# Patient Record
Sex: FEMALE | Race: BLACK OR AFRICAN AMERICAN | NOT HISPANIC OR LATINO
[De-identification: names, ages, dates, MRNs, and addresses within clinical notes are randomized per-mention and may not be internally consistent; named-entity substitution may affect disease eponyms.]

---

## 2017-01-11 ENCOUNTER — APPOINTMENT (OUTPATIENT)
Dept: INTERNAL MEDICINE | Facility: CLINIC | Age: 72
End: 2017-01-11

## 2017-01-11 VITALS
HEART RATE: 119 BPM | RESPIRATION RATE: 14 BRPM | TEMPERATURE: 98.4 F | WEIGHT: 163 LBS | DIASTOLIC BLOOD PRESSURE: 95 MMHG | BODY MASS INDEX: 30.78 KG/M2 | HEIGHT: 61 IN | OXYGEN SATURATION: 98 % | SYSTOLIC BLOOD PRESSURE: 158 MMHG

## 2017-01-11 VITALS — HEART RATE: 89 BPM

## 2017-01-11 DIAGNOSIS — R10.2 PELVIC AND PERINEAL PAIN: ICD-10-CM

## 2017-01-11 DIAGNOSIS — R00.0 TACHYCARDIA, UNSPECIFIED: ICD-10-CM

## 2017-02-05 ENCOUNTER — FORM ENCOUNTER (OUTPATIENT)
Age: 72
End: 2017-02-05

## 2017-02-06 ENCOUNTER — OUTPATIENT (OUTPATIENT)
Dept: OUTPATIENT SERVICES | Facility: HOSPITAL | Age: 72
LOS: 1 days | End: 2017-02-06
Payer: MEDICARE

## 2017-02-06 PROCEDURE — 77080 DXA BONE DENSITY AXIAL: CPT | Mod: 26

## 2017-02-06 PROCEDURE — 76830 TRANSVAGINAL US NON-OB: CPT

## 2017-02-06 PROCEDURE — 76830 TRANSVAGINAL US NON-OB: CPT | Mod: 26

## 2017-02-06 PROCEDURE — 77080 DXA BONE DENSITY AXIAL: CPT

## 2017-02-06 PROCEDURE — 76856 US EXAM PELVIC COMPLETE: CPT

## 2017-02-06 PROCEDURE — 76856 US EXAM PELVIC COMPLETE: CPT | Mod: 26

## 2017-02-09 DIAGNOSIS — D25.2 SUBSEROSAL LEIOMYOMA OF UTERUS: ICD-10-CM

## 2017-02-09 DIAGNOSIS — Z78.0 ASYMPTOMATIC MENOPAUSAL STATE: ICD-10-CM

## 2017-02-09 DIAGNOSIS — D25.1 INTRAMURAL LEIOMYOMA OF UTERUS: ICD-10-CM

## 2017-02-22 ENCOUNTER — APPOINTMENT (OUTPATIENT)
Dept: NEPHROLOGY | Facility: CLINIC | Age: 72
End: 2017-02-22

## 2017-02-22 VITALS — HEART RATE: 86 BPM | SYSTOLIC BLOOD PRESSURE: 120 MMHG | DIASTOLIC BLOOD PRESSURE: 72 MMHG

## 2017-02-22 DIAGNOSIS — E55.9 VITAMIN D DEFICIENCY, UNSPECIFIED: ICD-10-CM

## 2017-02-22 DIAGNOSIS — Z78.9 OTHER SPECIFIED HEALTH STATUS: ICD-10-CM

## 2017-02-23 LAB
APPEARANCE: CLEAR
BILIRUBIN URINE: NEGATIVE
BLOOD URINE: NEGATIVE
COLOR: YELLOW
GLUCOSE QUALITATIVE U: NORMAL MG/DL
KETONES URINE: NEGATIVE
LEUKOCYTE ESTERASE URINE: ABNORMAL
NITRITE URINE: NEGATIVE
PH URINE: 5
PROTEIN URINE: NEGATIVE MG/DL
SPECIFIC GRAVITY URINE: 1.02
UROBILINOGEN URINE: NORMAL MG/DL

## 2017-02-24 LAB — CORE LAB FLUID CYTOLOGY: NORMAL

## 2017-03-08 ENCOUNTER — APPOINTMENT (OUTPATIENT)
Dept: ENDOCRINOLOGY | Facility: CLINIC | Age: 72
End: 2017-03-08

## 2017-03-08 VITALS
HEART RATE: 98 BPM | BODY MASS INDEX: 30.04 KG/M2 | WEIGHT: 159 LBS | DIASTOLIC BLOOD PRESSURE: 78 MMHG | SYSTOLIC BLOOD PRESSURE: 132 MMHG

## 2017-03-09 ENCOUNTER — LABORATORY RESULT (OUTPATIENT)
Age: 72
End: 2017-03-09

## 2017-03-09 ENCOUNTER — APPOINTMENT (OUTPATIENT)
Dept: OBGYN | Facility: CLINIC | Age: 72
End: 2017-03-09

## 2017-03-09 VITALS
WEIGHT: 159 LBS | HEIGHT: 61 IN | SYSTOLIC BLOOD PRESSURE: 130 MMHG | BODY MASS INDEX: 30.02 KG/M2 | DIASTOLIC BLOOD PRESSURE: 90 MMHG

## 2017-03-09 DIAGNOSIS — Z86.19 PERSONAL HISTORY OF OTHER INFECTIOUS AND PARASITIC DISEASES: ICD-10-CM

## 2017-03-09 DIAGNOSIS — Z11.3 ENCOUNTER FOR SCREENING FOR INFECTIONS WITH A PREDOMINANTLY SEXUAL MODE OF TRANSMISSION: ICD-10-CM

## 2017-03-09 DIAGNOSIS — Z01.419 ENCOUNTER FOR GYNECOLOGICAL EXAMINATION (GENERAL) (ROUTINE) W/OUT ABNORMAL FINDINGS: ICD-10-CM

## 2017-03-13 LAB
C TRACH RRNA SPEC QL NAA+PROBE: NORMAL
CANDIDA VAG CYTO: DETECTED
G VAGINALIS+PREV SP MTYP VAG QL MICRO: NOT DETECTED
HBV SURFACE AG SER QL: NONREACTIVE
HIV1+2 AB SPEC QL IA.RAPID: NONREACTIVE
N GONORRHOEA RRNA SPEC QL NAA+PROBE: NORMAL
SOURCE TP AMPLIFICATION: NORMAL
T PALLIDUM AB SER QL IA: POSITIVE
T VAGINALIS VAG QL WET PREP: NOT DETECTED

## 2017-05-15 ENCOUNTER — APPOINTMENT (OUTPATIENT)
Dept: INTERNAL MEDICINE | Facility: CLINIC | Age: 72
End: 2017-05-15

## 2017-05-15 VITALS
BODY MASS INDEX: 30.4 KG/M2 | SYSTOLIC BLOOD PRESSURE: 137 MMHG | WEIGHT: 161 LBS | TEMPERATURE: 99 F | HEART RATE: 98 BPM | RESPIRATION RATE: 14 BRPM | HEIGHT: 61 IN | OXYGEN SATURATION: 98 % | DIASTOLIC BLOOD PRESSURE: 82 MMHG

## 2017-05-17 ENCOUNTER — OTHER (OUTPATIENT)
Age: 72
End: 2017-05-17

## 2017-06-05 ENCOUNTER — APPOINTMENT (OUTPATIENT)
Dept: GASTROENTEROLOGY | Facility: CLINIC | Age: 72
End: 2017-06-05

## 2017-06-05 VITALS
SYSTOLIC BLOOD PRESSURE: 130 MMHG | HEIGHT: 61 IN | DIASTOLIC BLOOD PRESSURE: 74 MMHG | BODY MASS INDEX: 30.02 KG/M2 | WEIGHT: 159 LBS | HEART RATE: 91 BPM | OXYGEN SATURATION: 98 % | TEMPERATURE: 98.4 F | RESPIRATION RATE: 16 BRPM

## 2017-06-06 ENCOUNTER — APPOINTMENT (OUTPATIENT)
Dept: ENDOCRINOLOGY | Facility: CLINIC | Age: 72
End: 2017-06-06

## 2017-06-06 VITALS
BODY MASS INDEX: 29.3 KG/M2 | WEIGHT: 161.25 LBS | DIASTOLIC BLOOD PRESSURE: 69 MMHG | HEIGHT: 62.4 IN | SYSTOLIC BLOOD PRESSURE: 116 MMHG | HEART RATE: 87 BPM

## 2017-06-06 RX ORDER — MICONAZOLE NITRATE 4 %
4 CREAM WITH PREFILLED APPLICATOR VAGINAL
Qty: 30 | Refills: 0 | Status: DISCONTINUED | COMMUNITY
Start: 2017-03-09 | End: 2017-06-06

## 2017-06-07 LAB
ALBUMIN SERPL ELPH-MCNC: 3.8 G/DL
ALP BLD-CCNC: 72 U/L
ALT SERPL-CCNC: 13 U/L
ANION GAP SERPL CALC-SCNC: 21 MMOL/L
AST SERPL-CCNC: 12 U/L
BASOPHILS # BLD AUTO: 0.05 K/UL
BASOPHILS NFR BLD AUTO: 0.5 %
BILIRUB SERPL-MCNC: 0.2 MG/DL
BUN SERPL-MCNC: 29 MG/DL
CALCIUM SERPL-MCNC: 9.9 MG/DL
CHLORIDE SERPL-SCNC: 100 MMOL/L
CO2 SERPL-SCNC: 19 MMOL/L
CREAT SERPL-MCNC: 1.27 MG/DL
EOSINOPHIL # BLD AUTO: 0.29 K/UL
EOSINOPHIL NFR BLD AUTO: 3 %
GLUCOSE SERPL-MCNC: 151 MG/DL
HCT VFR BLD CALC: 37.8 %
HGB BLD-MCNC: 12.2 G/DL
IMM GRANULOCYTES NFR BLD AUTO: 0.2 %
LYMPHOCYTES # BLD AUTO: 4.19 K/UL
LYMPHOCYTES NFR BLD AUTO: 43 %
MAN DIFF?: NORMAL
MCHC RBC-ENTMCNC: 27.7 PG
MCHC RBC-ENTMCNC: 32.3 GM/DL
MCV RBC AUTO: 85.7 FL
MONOCYTES # BLD AUTO: 0.52 K/UL
MONOCYTES NFR BLD AUTO: 5.3 %
NEUTROPHILS # BLD AUTO: 4.67 K/UL
NEUTROPHILS NFR BLD AUTO: 48 %
PLATELET # BLD AUTO: 339 K/UL
POTASSIUM SERPL-SCNC: 4.6 MMOL/L
PROT SERPL-MCNC: 6.9 G/DL
RBC # BLD: 4.41 M/UL
RBC # FLD: 13.7 %
SODIUM SERPL-SCNC: 140 MMOL/L
WBC # FLD AUTO: 9.74 K/UL

## 2017-06-09 LAB
CREAT SPEC-SCNC: 116 MG/DL
HBA1C MFR BLD HPLC: 8.5 %
MICROALBUMIN 24H UR DL<=1MG/L-MCNC: 2.1 MG/DL
MICROALBUMIN/CREAT 24H UR-RTO: 18

## 2017-06-22 ENCOUNTER — APPOINTMENT (OUTPATIENT)
Dept: NEPHROLOGY | Facility: CLINIC | Age: 72
End: 2017-06-22

## 2017-06-22 VITALS
SYSTOLIC BLOOD PRESSURE: 112 MMHG | WEIGHT: 161 LBS | DIASTOLIC BLOOD PRESSURE: 68 MMHG | BODY MASS INDEX: 29.07 KG/M2 | HEART RATE: 80 BPM

## 2017-06-28 ENCOUNTER — APPOINTMENT (OUTPATIENT)
Dept: HEART AND VASCULAR | Facility: CLINIC | Age: 72
End: 2017-06-28

## 2017-06-28 VITALS
BODY MASS INDEX: 29.96 KG/M2 | HEART RATE: 90 BPM | OXYGEN SATURATION: 99 % | WEIGHT: 160.72 LBS | SYSTOLIC BLOOD PRESSURE: 116 MMHG | HEIGHT: 61.42 IN | TEMPERATURE: 98 F | DIASTOLIC BLOOD PRESSURE: 64 MMHG

## 2017-06-28 DIAGNOSIS — Z86.79 PERSONAL HISTORY OF OTHER DISEASES OF THE CIRCULATORY SYSTEM: ICD-10-CM

## 2017-06-28 DIAGNOSIS — Z01.818 ENCOUNTER FOR OTHER PREPROCEDURAL EXAMINATION: ICD-10-CM

## 2017-08-01 ENCOUNTER — APPOINTMENT (OUTPATIENT)
Dept: GASTROENTEROLOGY | Facility: HOSPITAL | Age: 72
End: 2017-08-01

## 2017-08-25 ENCOUNTER — OTHER (OUTPATIENT)
Age: 72
End: 2017-08-25

## 2017-08-25 ENCOUNTER — RX RENEWAL (OUTPATIENT)
Age: 72
End: 2017-08-25

## 2017-08-30 ENCOUNTER — RX RENEWAL (OUTPATIENT)
Age: 72
End: 2017-08-30

## 2017-08-31 ENCOUNTER — RESULT REVIEW (OUTPATIENT)
Age: 72
End: 2017-08-31

## 2017-08-31 ENCOUNTER — OUTPATIENT (OUTPATIENT)
Dept: OUTPATIENT SERVICES | Facility: HOSPITAL | Age: 72
LOS: 1 days | Discharge: HOME | End: 2017-08-31
Payer: MEDICARE

## 2017-08-31 ENCOUNTER — APPOINTMENT (OUTPATIENT)
Dept: GASTROENTEROLOGY | Facility: HOSPITAL | Age: 72
End: 2017-08-31

## 2017-08-31 PROCEDURE — 88305 TISSUE EXAM BY PATHOLOGIST: CPT

## 2017-08-31 PROCEDURE — 45380 COLONOSCOPY AND BIOPSY: CPT | Mod: GC

## 2017-08-31 PROCEDURE — 45380 COLONOSCOPY AND BIOPSY: CPT

## 2017-09-01 LAB — SURGICAL PATHOLOGY STUDY: SIGNIFICANT CHANGE UP

## 2017-09-04 DIAGNOSIS — E11.9 TYPE 2 DIABETES MELLITUS WITHOUT COMPLICATIONS: ICD-10-CM

## 2017-09-04 DIAGNOSIS — D12.2 BENIGN NEOPLASM OF ASCENDING COLON: ICD-10-CM

## 2017-09-04 DIAGNOSIS — Z12.11 ENCOUNTER FOR SCREENING FOR MALIGNANT NEOPLASM OF COLON: ICD-10-CM

## 2017-09-04 DIAGNOSIS — I10 ESSENTIAL (PRIMARY) HYPERTENSION: ICD-10-CM

## 2017-10-04 ENCOUNTER — APPOINTMENT (OUTPATIENT)
Dept: ENDOCRINOLOGY | Facility: CLINIC | Age: 72
End: 2017-10-04
Payer: MEDICARE

## 2017-10-04 VITALS
BODY MASS INDEX: 30.01 KG/M2 | DIASTOLIC BLOOD PRESSURE: 77 MMHG | SYSTOLIC BLOOD PRESSURE: 153 MMHG | HEIGHT: 61.42 IN | HEART RATE: 85 BPM | WEIGHT: 161 LBS

## 2017-10-04 PROCEDURE — 99214 OFFICE O/P EST MOD 30 MIN: CPT

## 2017-10-05 LAB
ANION GAP SERPL CALC-SCNC: 16 MMOL/L
BUN SERPL-MCNC: 38 MG/DL
CALCIUM SERPL-MCNC: 10.1 MG/DL
CHLORIDE SERPL-SCNC: 102 MMOL/L
CO2 SERPL-SCNC: 26 MMOL/L
CREAT SERPL-MCNC: 1.26 MG/DL
GLUCOSE SERPL-MCNC: 72 MG/DL
HBA1C MFR BLD HPLC: 7.8 %
POTASSIUM SERPL-SCNC: 5.1 MMOL/L
SODIUM SERPL-SCNC: 144 MMOL/L
TSH SERPL-ACNC: 1.67 UIU/ML

## 2017-10-10 ENCOUNTER — LABORATORY RESULT (OUTPATIENT)
Age: 72
End: 2017-10-10

## 2017-10-10 ENCOUNTER — APPOINTMENT (OUTPATIENT)
Dept: OBGYN | Facility: CLINIC | Age: 72
End: 2017-10-10
Payer: MEDICARE

## 2017-10-10 VITALS
HEIGHT: 61.42 IN | WEIGHT: 166 LBS | DIASTOLIC BLOOD PRESSURE: 80 MMHG | SYSTOLIC BLOOD PRESSURE: 120 MMHG | BODY MASS INDEX: 30.94 KG/M2

## 2017-10-10 DIAGNOSIS — R30.0 DYSURIA: ICD-10-CM

## 2017-10-10 PROCEDURE — 99214 OFFICE O/P EST MOD 30 MIN: CPT

## 2017-10-11 ENCOUNTER — APPOINTMENT (OUTPATIENT)
Dept: INTERNAL MEDICINE | Facility: CLINIC | Age: 72
End: 2017-10-11
Payer: MEDICARE

## 2017-10-11 VITALS
OXYGEN SATURATION: 99 % | HEART RATE: 93 BPM | SYSTOLIC BLOOD PRESSURE: 125 MMHG | DIASTOLIC BLOOD PRESSURE: 71 MMHG | TEMPERATURE: 98.9 F | HEIGHT: 61 IN | RESPIRATION RATE: 16 BRPM | BODY MASS INDEX: 30.96 KG/M2 | WEIGHT: 164 LBS

## 2017-10-11 LAB
APPEARANCE: ABNORMAL
BILIRUBIN URINE: NEGATIVE
BLOOD URINE: ABNORMAL
COLOR: YELLOW
GLUCOSE QUALITATIVE U: NEGATIVE MG/DL
KETONES URINE: NEGATIVE
LEUKOCYTE ESTERASE URINE: ABNORMAL
NITRITE URINE: NEGATIVE
PH URINE: 6
PROTEIN URINE: 30 MG/DL
SPECIFIC GRAVITY URINE: 1.01
UROBILINOGEN URINE: NEGATIVE MG/DL

## 2017-10-11 PROCEDURE — 99214 OFFICE O/P EST MOD 30 MIN: CPT

## 2017-10-12 LAB
CANDIDA VAG CYTO: DETECTED
G VAGINALIS+PREV SP MTYP VAG QL MICRO: NOT DETECTED
T VAGINALIS VAG QL WET PREP: NOT DETECTED

## 2017-10-23 ENCOUNTER — OTHER (OUTPATIENT)
Age: 72
End: 2017-10-23

## 2017-10-24 ENCOUNTER — LABORATORY RESULT (OUTPATIENT)
Age: 72
End: 2017-10-24

## 2017-10-24 ENCOUNTER — APPOINTMENT (OUTPATIENT)
Dept: OBGYN | Facility: CLINIC | Age: 72
End: 2017-10-24
Payer: MEDICARE

## 2017-10-24 VITALS
BODY MASS INDEX: 32.79 KG/M2 | HEIGHT: 60 IN | SYSTOLIC BLOOD PRESSURE: 140 MMHG | DIASTOLIC BLOOD PRESSURE: 84 MMHG | WEIGHT: 167 LBS

## 2017-10-24 DIAGNOSIS — L28.0 LICHEN SIMPLEX CHRONICUS: ICD-10-CM

## 2017-10-24 PROCEDURE — 56605 BIOPSY OF VULVA/PERINEUM: CPT

## 2017-10-24 RX ORDER — HALOBETASOL PROPIONATE 0.5 MG/G
0.05 OINTMENT TOPICAL
Qty: 1 | Refills: 1 | Status: ACTIVE | COMMUNITY
Start: 2017-10-24 | End: 1900-01-01

## 2017-10-24 RX ORDER — CLOBETASOL PROPIONATE 0.5 MG/G
0.05 OINTMENT TOPICAL TWICE DAILY
Qty: 1 | Refills: 3 | Status: COMPLETED | COMMUNITY
Start: 2017-03-09 | End: 2017-10-24

## 2017-10-25 LAB
APPEARANCE: CLEAR
BILIRUBIN URINE: NEGATIVE
BLOOD URINE: NEGATIVE
COLOR: YELLOW
GLUCOSE QUALITATIVE U: NEGATIVE MG/DL
KETONES URINE: NEGATIVE
LEUKOCYTE ESTERASE URINE: ABNORMAL
NITRITE URINE: NEGATIVE
PH URINE: 5.5
PROTEIN URINE: NEGATIVE MG/DL
SPECIFIC GRAVITY URINE: 1.02
UROBILINOGEN URINE: NEGATIVE MG/DL

## 2017-10-26 ENCOUNTER — OTHER (OUTPATIENT)
Age: 72
End: 2017-10-26

## 2017-10-26 LAB
BACTERIA UR CULT: NORMAL
CORE LAB FLUID CYTOLOGY: NORMAL

## 2017-10-31 LAB — CORE LAB BIOPSY: NORMAL

## 2017-11-07 ENCOUNTER — APPOINTMENT (OUTPATIENT)
Dept: OBGYN | Facility: CLINIC | Age: 72
End: 2017-11-07
Payer: MEDICARE

## 2017-11-07 VITALS
SYSTOLIC BLOOD PRESSURE: 130 MMHG | WEIGHT: 167 LBS | HEIGHT: 60 IN | BODY MASS INDEX: 32.79 KG/M2 | DIASTOLIC BLOOD PRESSURE: 80 MMHG

## 2017-11-07 PROCEDURE — 99213 OFFICE O/P EST LOW 20 MIN: CPT

## 2017-11-09 ENCOUNTER — OTHER (OUTPATIENT)
Age: 72
End: 2017-11-09

## 2017-11-09 LAB
APPEARANCE: CLEAR
BILIRUBIN URINE: NEGATIVE
BLOOD URINE: NEGATIVE
COLOR: YELLOW
GLUCOSE QUALITATIVE U: NEGATIVE MG/DL
KETONES URINE: NEGATIVE
LEUKOCYTE ESTERASE URINE: NEGATIVE
NITRITE URINE: NEGATIVE
PH URINE: 5
PROTEIN URINE: NEGATIVE MG/DL
SPECIFIC GRAVITY URINE: 1.02
UROBILINOGEN URINE: NEGATIVE MG/DL

## 2017-11-13 ENCOUNTER — APPOINTMENT (OUTPATIENT)
Dept: OBGYN | Facility: CLINIC | Age: 72
End: 2017-11-13
Payer: MEDICARE

## 2017-11-13 VITALS — SYSTOLIC BLOOD PRESSURE: 130 MMHG | DIASTOLIC BLOOD PRESSURE: 80 MMHG

## 2017-11-13 VITALS
SYSTOLIC BLOOD PRESSURE: 170 MMHG | HEIGHT: 60 IN | DIASTOLIC BLOOD PRESSURE: 100 MMHG | WEIGHT: 167 LBS | BODY MASS INDEX: 32.79 KG/M2

## 2017-11-13 VITALS — DIASTOLIC BLOOD PRESSURE: 100 MMHG | SYSTOLIC BLOOD PRESSURE: 150 MMHG

## 2017-11-13 DIAGNOSIS — N88.2 STRICTURE AND STENOSIS OF CERVIX UTERI: ICD-10-CM

## 2017-11-13 DIAGNOSIS — Z86.018 PERSONAL HISTORY OF OTHER BENIGN NEOPLASM: ICD-10-CM

## 2017-11-13 LAB
HCG UR QL: NEGATIVE
QUALITY CONTROL: YES

## 2017-11-13 PROCEDURE — 58100 BIOPSY OF UTERUS LINING: CPT

## 2017-11-13 PROCEDURE — 81025 URINE PREGNANCY TEST: CPT

## 2017-11-14 ENCOUNTER — APPOINTMENT (OUTPATIENT)
Dept: OPHTHALMOLOGY | Facility: CLINIC | Age: 72
End: 2017-11-14
Payer: MEDICARE

## 2017-11-14 DIAGNOSIS — H25.13 AGE-RELATED NUCLEAR CATARACT, BILATERAL: ICD-10-CM

## 2017-11-14 PROCEDURE — 92004 COMPRE OPH EXAM NEW PT 1/>: CPT

## 2017-11-14 PROCEDURE — 92225: CPT | Mod: LT

## 2017-11-14 PROCEDURE — 92134 CPTRZ OPH DX IMG PST SGM RTA: CPT

## 2017-11-20 LAB — CORE LAB BIOPSY: NORMAL

## 2017-11-21 ENCOUNTER — APPOINTMENT (OUTPATIENT)
Dept: OBGYN | Facility: CLINIC | Age: 72
End: 2017-11-21
Payer: MEDICARE

## 2017-11-21 VITALS
BODY MASS INDEX: 32.79 KG/M2 | WEIGHT: 167 LBS | SYSTOLIC BLOOD PRESSURE: 150 MMHG | DIASTOLIC BLOOD PRESSURE: 80 MMHG | HEIGHT: 60 IN

## 2017-11-21 PROCEDURE — 99213 OFFICE O/P EST LOW 20 MIN: CPT

## 2017-11-27 ENCOUNTER — APPOINTMENT (OUTPATIENT)
Dept: UROLOGY | Facility: CLINIC | Age: 72
End: 2017-11-27
Payer: MEDICARE

## 2017-11-27 VITALS — TEMPERATURE: 99.4 F | DIASTOLIC BLOOD PRESSURE: 79 MMHG | SYSTOLIC BLOOD PRESSURE: 139 MMHG | HEART RATE: 98 BPM

## 2017-11-27 DIAGNOSIS — N39.0 URINARY TRACT INFECTION, SITE NOT SPECIFIED: ICD-10-CM

## 2017-11-27 DIAGNOSIS — R31.0 GROSS HEMATURIA: ICD-10-CM

## 2017-11-27 DIAGNOSIS — R10.9 UNSPECIFIED ABDOMINAL PAIN: ICD-10-CM

## 2017-11-27 PROCEDURE — 99204 OFFICE O/P NEW MOD 45 MIN: CPT

## 2017-11-28 LAB
APPEARANCE: CLEAR
BACTERIA: NEGATIVE
BILIRUBIN URINE: NEGATIVE
BLOOD URINE: NEGATIVE
COLOR: YELLOW
GLUCOSE QUALITATIVE U: 100 MG/DL
HYALINE CASTS: 3 /LPF
KETONES URINE: NEGATIVE
LEUKOCYTE ESTERASE URINE: NEGATIVE
MICROSCOPIC-UA: NORMAL
NITRITE URINE: NEGATIVE
PH URINE: 7.5
PROTEIN URINE: NEGATIVE MG/DL
RED BLOOD CELLS URINE: 1 /HPF
SPECIFIC GRAVITY URINE: 1.02
SQUAMOUS EPITHELIAL CELLS: 6 /HPF
UROBILINOGEN URINE: NEGATIVE MG/DL
WHITE BLOOD CELLS URINE: 2 /HPF

## 2017-11-29 LAB — BACTERIA UR CULT: ABNORMAL

## 2017-12-02 LAB — HLX UV FISH FINAL REPORT: NORMAL

## 2017-12-11 ENCOUNTER — FORM ENCOUNTER (OUTPATIENT)
Age: 72
End: 2017-12-11

## 2017-12-12 ENCOUNTER — OUTPATIENT (OUTPATIENT)
Dept: OUTPATIENT SERVICES | Facility: HOSPITAL | Age: 72
LOS: 1 days | End: 2017-12-12
Payer: MEDICARE

## 2017-12-12 PROCEDURE — 76770 US EXAM ABDO BACK WALL COMP: CPT | Mod: 26

## 2017-12-12 PROCEDURE — 76770 US EXAM ABDO BACK WALL COMP: CPT

## 2017-12-19 LAB
24R-OH-CALCIDIOL SERPL-MCNC: 54.9 PG/ML
25(OH)D3 SERPL-MCNC: 44.9 NG/ML
ALBUMIN SERPL ELPH-MCNC: 4.5 G/DL
ALP BLD-CCNC: 74 U/L
ALT SERPL-CCNC: 15 U/L
ANION GAP SERPL CALC-SCNC: 14 MMOL/L
APPEARANCE: CLEAR
AST SERPL-CCNC: 14 U/L
BACTERIA: ABNORMAL
BASOPHILS # BLD AUTO: 0.08 K/UL
BASOPHILS NFR BLD AUTO: 0.9 %
BILIRUB SERPL-MCNC: 0.2 MG/DL
BILIRUBIN URINE: NEGATIVE
BLOOD URINE: NEGATIVE
BUN SERPL-MCNC: 36 MG/DL
CALCIUM SERPL-MCNC: 10.7 MG/DL
CALCIUM SERPL-MCNC: 10.7 MG/DL
CHLORIDE SERPL-SCNC: 103 MMOL/L
CHOLEST SERPL-MCNC: 181 MG/DL
CHOLEST/HDLC SERPL: 3.3 RATIO
CO2 SERPL-SCNC: 25 MMOL/L
COLOR: YELLOW
CREAT SERPL-MCNC: 1.29 MG/DL
CREAT SPEC-SCNC: 58 MG/DL
EOSINOPHIL # BLD AUTO: 0.33 K/UL
EOSINOPHIL NFR BLD AUTO: 3.8 %
FERRITIN SERPL-MCNC: 38 NG/ML
GLUCOSE QUALITATIVE U: NEGATIVE MG/DL
GLUCOSE SERPL-MCNC: 152 MG/DL
HBA1C MFR BLD HPLC: 7.8 %
HCT VFR BLD CALC: 37.4 %
HDLC SERPL-MCNC: 55 MG/DL
HGB BLD-MCNC: 12.3 G/DL
HYALINE CASTS: 5 /LPF
IMM GRANULOCYTES NFR BLD AUTO: 0.2 %
IRON SATN MFR SERPL: 16 %
IRON SERPL-MCNC: 65 UG/DL
KETONES URINE: NEGATIVE
LDLC SERPL CALC-MCNC: 99 MG/DL
LEUKOCYTE ESTERASE URINE: NEGATIVE
LYMPHOCYTES # BLD AUTO: 4.43 K/UL
LYMPHOCYTES NFR BLD AUTO: 51.2 %
MAGNESIUM SERPL-MCNC: 1.7 MG/DL
MAN DIFF?: NORMAL
MCHC RBC-ENTMCNC: 28.5 PG
MCHC RBC-ENTMCNC: 32.9 GM/DL
MCV RBC AUTO: 86.6 FL
MICROALBUMIN 24H UR DL<=1MG/L-MCNC: 8.4 MG/DL
MICROALBUMIN/CREAT 24H UR-RTO: 145 MG/G
MICROSCOPIC-UA: NORMAL
MONOCYTES # BLD AUTO: 0.49 K/UL
MONOCYTES NFR BLD AUTO: 5.7 %
NEUTROPHILS # BLD AUTO: 3.31 K/UL
NEUTROPHILS NFR BLD AUTO: 38.2 %
NITRITE URINE: NEGATIVE
PARATHYROID HORMONE INTACT: 31 PG/ML
PH URINE: 5.5
PHOSPHATE SERPL-MCNC: 3.4 MG/DL
PLATELET # BLD AUTO: 343 K/UL
POTASSIUM SERPL-SCNC: 5.2 MMOL/L
PROT SERPL-MCNC: 7.4 G/DL
PROTEIN URINE: ABNORMAL MG/DL
RBC # BLD: 4.32 M/UL
RBC # FLD: 13.8 %
RED BLOOD CELLS URINE: 3 /HPF
SODIUM SERPL-SCNC: 142 MMOL/L
SPECIFIC GRAVITY URINE: 1.02
SQUAMOUS EPITHELIAL CELLS: 7 /HPF
TIBC SERPL-MCNC: 417 UG/DL
TRIGL SERPL-MCNC: 135 MG/DL
UIBC SERPL-MCNC: 352 UG/DL
URATE SERPL-MCNC: 6.1 MG/DL
UROBILINOGEN URINE: NEGATIVE MG/DL
WBC # FLD AUTO: 8.66 K/UL
WHITE BLOOD CELLS URINE: 5 /HPF

## 2017-12-20 ENCOUNTER — APPOINTMENT (OUTPATIENT)
Dept: NEPHROLOGY | Facility: CLINIC | Age: 72
End: 2017-12-20
Payer: MEDICARE

## 2017-12-20 VITALS
HEART RATE: 80 BPM | WEIGHT: 167 LBS | SYSTOLIC BLOOD PRESSURE: 130 MMHG | BODY MASS INDEX: 32.62 KG/M2 | DIASTOLIC BLOOD PRESSURE: 80 MMHG

## 2017-12-20 PROCEDURE — 99214 OFFICE O/P EST MOD 30 MIN: CPT

## 2017-12-20 RX ORDER — POLYETHYLENE GLYCOL 3350 AND ELECTROLYTES WITH LEMON FLAVOR 236; 22.74; 6.74; 5.86; 2.97 G/4L; G/4L; G/4L; G/4L; G/4L
236 POWDER, FOR SOLUTION ORAL
Qty: 1 | Refills: 0 | Status: DISCONTINUED | COMMUNITY
Start: 2017-07-25 | End: 2017-12-20

## 2017-12-20 RX ORDER — NAPROXEN 250 MG/1
250 TABLET ORAL
Qty: 10 | Refills: 0 | Status: DISCONTINUED | COMMUNITY
Start: 2017-10-11 | End: 2017-12-20

## 2017-12-20 RX ORDER — CIPROFLOXACIN HYDROCHLORIDE 500 MG/1
500 TABLET, FILM COATED ORAL
Qty: 1 | Refills: 0 | Status: DISCONTINUED | COMMUNITY
Start: 2017-10-10 | End: 2017-12-20

## 2018-01-10 ENCOUNTER — APPOINTMENT (OUTPATIENT)
Dept: UROLOGY | Facility: CLINIC | Age: 73
End: 2018-01-10

## 2018-02-12 ENCOUNTER — APPOINTMENT (OUTPATIENT)
Dept: INTERNAL MEDICINE | Facility: CLINIC | Age: 73
End: 2018-02-12
Payer: MEDICARE

## 2018-02-12 VITALS
HEIGHT: 60 IN | OXYGEN SATURATION: 99 % | RESPIRATION RATE: 16 BRPM | TEMPERATURE: 98.7 F | DIASTOLIC BLOOD PRESSURE: 88 MMHG | WEIGHT: 165.13 LBS | SYSTOLIC BLOOD PRESSURE: 147 MMHG | BODY MASS INDEX: 32.42 KG/M2 | HEART RATE: 103 BPM

## 2018-02-12 DIAGNOSIS — Z23 ENCOUNTER FOR IMMUNIZATION: ICD-10-CM

## 2018-02-12 PROCEDURE — 99214 OFFICE O/P EST MOD 30 MIN: CPT | Mod: 25

## 2018-02-12 PROCEDURE — G0009: CPT

## 2018-02-12 PROCEDURE — 90732 PPSV23 VACC 2 YRS+ SUBQ/IM: CPT

## 2018-02-13 ENCOUNTER — APPOINTMENT (OUTPATIENT)
Dept: OPHTHALMOLOGY | Facility: CLINIC | Age: 73
End: 2018-02-13
Payer: MEDICARE

## 2018-02-13 PROCEDURE — 92133 CPTRZD OPH DX IMG PST SGM ON: CPT

## 2018-02-13 PROCEDURE — 92083 EXTENDED VISUAL FIELD XM: CPT

## 2018-02-13 PROCEDURE — 76514 ECHO EXAM OF EYE THICKNESS: CPT

## 2018-02-13 PROCEDURE — 92012 INTRM OPH EXAM EST PATIENT: CPT

## 2018-03-01 ENCOUNTER — RX RENEWAL (OUTPATIENT)
Age: 73
End: 2018-03-01

## 2018-04-06 ENCOUNTER — MEDICATION RENEWAL (OUTPATIENT)
Age: 73
End: 2018-04-06

## 2018-04-06 ENCOUNTER — APPOINTMENT (OUTPATIENT)
Dept: ENDOCRINOLOGY | Facility: CLINIC | Age: 73
End: 2018-04-06
Payer: MEDICARE

## 2018-04-06 VITALS
HEIGHT: 62 IN | WEIGHT: 163 LBS | DIASTOLIC BLOOD PRESSURE: 76 MMHG | HEART RATE: 82 BPM | BODY MASS INDEX: 30 KG/M2 | SYSTOLIC BLOOD PRESSURE: 126 MMHG

## 2018-04-06 PROCEDURE — 99214 OFFICE O/P EST MOD 30 MIN: CPT

## 2018-04-09 LAB
ANION GAP SERPL CALC-SCNC: 13 MMOL/L
BUN SERPL-MCNC: 37 MG/DL
CALCIUM SERPL-MCNC: 10.2 MG/DL
CHLORIDE SERPL-SCNC: 102 MMOL/L
CHOLEST SERPL-MCNC: 214 MG/DL
CHOLEST/HDLC SERPL: 3.6 RATIO
CO2 SERPL-SCNC: 28 MMOL/L
CREAT SERPL-MCNC: 1.43 MG/DL
GLUCOSE SERPL-MCNC: 64 MG/DL
HBA1C MFR BLD HPLC: 8.8 %
HDLC SERPL-MCNC: 60 MG/DL
LDLC SERPL CALC-MCNC: 131 MG/DL
POTASSIUM SERPL-SCNC: 4.2 MMOL/L
SODIUM SERPL-SCNC: 143 MMOL/L
TRIGL SERPL-MCNC: 113 MG/DL

## 2018-04-25 ENCOUNTER — APPOINTMENT (OUTPATIENT)
Dept: NEPHROLOGY | Facility: CLINIC | Age: 73
End: 2018-04-25
Payer: MEDICARE

## 2018-04-25 VITALS
BODY MASS INDEX: 29.45 KG/M2 | DIASTOLIC BLOOD PRESSURE: 70 MMHG | WEIGHT: 161 LBS | HEART RATE: 80 BPM | SYSTOLIC BLOOD PRESSURE: 120 MMHG

## 2018-04-25 DIAGNOSIS — Z78.9 OTHER SPECIFIED HEALTH STATUS: ICD-10-CM

## 2018-04-25 PROCEDURE — 99214 OFFICE O/P EST MOD 30 MIN: CPT

## 2018-04-26 LAB
CREAT SPEC-SCNC: 89 MG/DL
MICROALBUMIN 24H UR DL<=1MG/L-MCNC: 5.5 MG/DL
MICROALBUMIN/CREAT 24H UR-RTO: 62 MG/G

## 2018-04-29 PROBLEM — Z78.9 DOES NOT USE ILLICIT DRUGS: Status: ACTIVE | Noted: 2017-06-28

## 2018-04-29 PROBLEM — Z78.9 CAFFEINE USE: Status: ACTIVE | Noted: 2017-06-28

## 2018-05-08 ENCOUNTER — APPOINTMENT (OUTPATIENT)
Dept: INTERNAL MEDICINE | Facility: CLINIC | Age: 73
End: 2018-05-08
Payer: MEDICARE

## 2018-05-08 VITALS
HEIGHT: 62 IN | OXYGEN SATURATION: 98 % | BODY MASS INDEX: 30.18 KG/M2 | TEMPERATURE: 98.6 F | DIASTOLIC BLOOD PRESSURE: 84 MMHG | HEART RATE: 106 BPM | WEIGHT: 164 LBS | SYSTOLIC BLOOD PRESSURE: 141 MMHG

## 2018-05-08 PROCEDURE — 99214 OFFICE O/P EST MOD 30 MIN: CPT

## 2018-06-06 ENCOUNTER — LABORATORY RESULT (OUTPATIENT)
Age: 73
End: 2018-06-06

## 2018-06-06 ENCOUNTER — APPOINTMENT (OUTPATIENT)
Dept: INTERNAL MEDICINE | Facility: CLINIC | Age: 73
End: 2018-06-06
Payer: MEDICARE

## 2018-06-06 VITALS
DIASTOLIC BLOOD PRESSURE: 86 MMHG | TEMPERATURE: 98.6 F | HEIGHT: 62 IN | SYSTOLIC BLOOD PRESSURE: 143 MMHG | HEART RATE: 107 BPM | OXYGEN SATURATION: 98 % | WEIGHT: 161 LBS | BODY MASS INDEX: 29.63 KG/M2

## 2018-06-06 PROCEDURE — 99214 OFFICE O/P EST MOD 30 MIN: CPT | Mod: 25

## 2018-06-06 PROCEDURE — 36415 COLL VENOUS BLD VENIPUNCTURE: CPT

## 2018-06-12 LAB
A ALTERNATA IGE QN: <0.1 KUA/L
A FUMIGATUS IGE QN: <0.1 KUA/L
BARLEY IGE QN: <0.1 KUA/L
BERMUDA GRASS IGE QN: <0.1 KUA/L
BOXELDER IGE QN: <0.1 KUA/L
C HERBARUM IGE QN: <0.1 KUA/L
CALIF WALNUT IGE QN: <0.1 KUA/L
CAT DANDER IGE QN: <0.1 KUA/L
CHERRY IGE QN: <0.1 KUA/L
CMN PIGWEED IGE QN: <0.1 KUA/L
COMMON RAGWEED IGE QN: <0.1 KUA/L
COTTONWOOD IGE QN: <0.1 KUA/L
COW MILK IGE QN: <0.1 KUA/L
CRAB IGE QN: 0.7 KUA/L
D FARINAE IGE QN: 1.1 KUA/L
D PTERONYSS IGE QN: 1.13 KUA/L
DEPRECATED A ALTERNATA IGE RAST QL: 0
DEPRECATED A FUMIGATUS IGE RAST QL: 0
DEPRECATED BARLEY IGE RAST QL: 0
DEPRECATED BERMUDA GRASS IGE RAST QL: 0
DEPRECATED BOXELDER IGE RAST QL: 0
DEPRECATED C HERBARUM IGE RAST QL: 0
DEPRECATED CAT DANDER IGE RAST QL: 0
DEPRECATED CHERRY IGE RAST QL: 0
DEPRECATED COMMON PIGWEED IGE RAST QL: 0
DEPRECATED COMMON RAGWEED IGE RAST QL: 0
DEPRECATED COTTONWOOD IGE RAST QL: 0
DEPRECATED COW MILK IGE RAST QL: 0
DEPRECATED CRAB IGE RAST QL: ABNORMAL
DEPRECATED D FARINAE IGE RAST QL: ABNORMAL
DEPRECATED D PTERONYSS IGE RAST QL: ABNORMAL
DEPRECATED DOG DANDER IGE RAST QL: 0
DEPRECATED EGG WHITE IGE RAST QL: 0
DEPRECATED GOOSEFOOT IGE RAST QL: 0
DEPRECATED LONDON PLANE IGE RAST QL: 0
DEPRECATED MUGWORT IGE RAST QL: 0
DEPRECATED OAT IGE RAST QL: 0
DEPRECATED P NOTATUM IGE RAST QL: 0
DEPRECATED PEANUT IGE RAST QL: 0
DEPRECATED RED CEDAR IGE RAST QL: 0
DEPRECATED ROACH IGE RAST QL: ABNORMAL
DEPRECATED RYE IGE RAST QL: 0
DEPRECATED SHEEP SORREL IGE RAST QL: 0
DEPRECATED SILVER BIRCH IGE RAST QL: 0
DEPRECATED SOYBEAN IGE RAST QL: 0
DEPRECATED TIMOTHY IGE RAST QL: 0
DEPRECATED WHEAT IGE RAST QL: 0
DEPRECATED WHITE ASH IGE RAST QL: 0
DEPRECATED WHITE OAK IGE RAST QL: 0
DOG DANDER IGE QN: <0.1 KUA/L
EGG WHITE IGE QN: <0.1 KUA/L
GOOSEFOOT IGE QN: <0.1 KUA/L
LONDON PLANE IGE QN: <0.1 KUA/L
MUGWORT IGE QN: <0.1 KUA/L
MULBERRY (T70) CLASS: 0
MULBERRY (T70) CONC: <0.1 KUA/L
OAT IGE QN: <0.1 KUA/L
P NOTATUM IGE QN: <0.1 KUA/L
PEANUT IGE QN: <0.1 KUA/L
RED CEDAR IGE QN: <0.1 KUA/L
ROACH IGE QN: 0.77 KUA/L
RYE IGE QN: <0.1 KUA/L
SHEEP SORREL IGE QN: <0.1 KUA/L
SILVER BIRCH IGE QN: <0.1 KUA/L
SOYBEAN IGE QN: <0.1 KUA/L
TIMOTHY IGE QN: <0.1 KUA/L
TOTAL IGE SMQN RAST: 241 KU/L
TREE ALLERG MIX1 IGE QL: 0
WHEAT IGE QN: <0.1 KUA/L
WHITE ASH IGE QN: <0.1 KUA/L
WHITE ELM IGE QN: 0
WHITE ELM IGE QN: <0.1 KUA/L
WHITE OAK IGE QN: <0.1 KUA/L

## 2018-07-13 ENCOUNTER — APPOINTMENT (OUTPATIENT)
Dept: ENDOCRINOLOGY | Facility: CLINIC | Age: 73
End: 2018-07-13
Payer: MEDICARE

## 2018-07-13 VITALS
HEART RATE: 112 BPM | BODY MASS INDEX: 30 KG/M2 | SYSTOLIC BLOOD PRESSURE: 144 MMHG | WEIGHT: 163 LBS | DIASTOLIC BLOOD PRESSURE: 80 MMHG | HEIGHT: 62 IN

## 2018-07-13 PROCEDURE — 99214 OFFICE O/P EST MOD 30 MIN: CPT

## 2018-07-19 ENCOUNTER — MEDICATION RENEWAL (OUTPATIENT)
Age: 73
End: 2018-07-19

## 2018-09-04 ENCOUNTER — APPOINTMENT (OUTPATIENT)
Dept: OPHTHALMOLOGY | Facility: CLINIC | Age: 73
End: 2018-09-04
Payer: MEDICARE

## 2018-09-04 PROCEDURE — 92012 INTRM OPH EXAM EST PATIENT: CPT

## 2018-09-04 PROCEDURE — 92134 CPTRZ OPH DX IMG PST SGM RTA: CPT

## 2018-09-06 ENCOUNTER — APPOINTMENT (OUTPATIENT)
Dept: NEPHROLOGY | Facility: CLINIC | Age: 73
End: 2018-09-06
Payer: MEDICARE

## 2018-09-06 VITALS — HEART RATE: 88 BPM | DIASTOLIC BLOOD PRESSURE: 80 MMHG | SYSTOLIC BLOOD PRESSURE: 128 MMHG

## 2018-09-06 DIAGNOSIS — Z87.898 PERSONAL HISTORY OF OTHER SPECIFIED CONDITIONS: ICD-10-CM

## 2018-09-06 PROCEDURE — 99214 OFFICE O/P EST MOD 30 MIN: CPT

## 2018-09-10 ENCOUNTER — APPOINTMENT (OUTPATIENT)
Dept: INTERNAL MEDICINE | Facility: CLINIC | Age: 73
End: 2018-09-10
Payer: MEDICARE

## 2018-09-10 ENCOUNTER — OTHER (OUTPATIENT)
Age: 73
End: 2018-09-10

## 2018-09-10 VITALS
DIASTOLIC BLOOD PRESSURE: 86 MMHG | SYSTOLIC BLOOD PRESSURE: 130 MMHG | BODY MASS INDEX: 29.54 KG/M2 | HEART RATE: 86 BPM | WEIGHT: 161.5 LBS | RESPIRATION RATE: 14 BRPM

## 2018-09-10 DIAGNOSIS — B35.6 TINEA CRURIS: ICD-10-CM

## 2018-09-10 PROCEDURE — 99214 OFFICE O/P EST MOD 30 MIN: CPT | Mod: 25

## 2018-09-10 PROCEDURE — 36415 COLL VENOUS BLD VENIPUNCTURE: CPT

## 2018-09-11 LAB
ALBUMIN SERPL ELPH-MCNC: 4.6 G/DL
ALP BLD-CCNC: 88 U/L
ALT SERPL-CCNC: 13 U/L
ANION GAP SERPL CALC-SCNC: 13 MMOL/L
AST SERPL-CCNC: 18 U/L
BILIRUB SERPL-MCNC: 0.2 MG/DL
BUN SERPL-MCNC: 38 MG/DL
CALCIUM SERPL-MCNC: 10.1 MG/DL
CHLORIDE SERPL-SCNC: 99 MMOL/L
CHOLEST SERPL-MCNC: 150 MG/DL
CHOLEST/HDLC SERPL: 2.7 RATIO
CO2 SERPL-SCNC: 30 MMOL/L
CREAT SERPL-MCNC: 1.32 MG/DL
GLUCOSE SERPL-MCNC: 84 MG/DL
HBA1C MFR BLD HPLC: 8.6 %
HDLC SERPL-MCNC: 56 MG/DL
LDLC SERPL CALC-MCNC: 76 MG/DL
POTASSIUM SERPL-SCNC: 4.9 MMOL/L
PROT SERPL-MCNC: 7.3 G/DL
SODIUM SERPL-SCNC: 142 MMOL/L
TRIGL SERPL-MCNC: 92 MG/DL

## 2018-10-03 ENCOUNTER — OTHER (OUTPATIENT)
Age: 73
End: 2018-10-03

## 2018-10-18 ENCOUNTER — APPOINTMENT (OUTPATIENT)
Dept: ENDOCRINOLOGY | Facility: CLINIC | Age: 73
End: 2018-10-18
Payer: MEDICARE

## 2018-10-18 VITALS
HEART RATE: 96 BPM | SYSTOLIC BLOOD PRESSURE: 138 MMHG | DIASTOLIC BLOOD PRESSURE: 80 MMHG | WEIGHT: 164 LBS | BODY MASS INDEX: 30 KG/M2

## 2018-10-18 PROCEDURE — 99214 OFFICE O/P EST MOD 30 MIN: CPT

## 2018-11-16 ENCOUNTER — LABORATORY RESULT (OUTPATIENT)
Age: 73
End: 2018-11-16

## 2018-11-26 ENCOUNTER — MEDICATION RENEWAL (OUTPATIENT)
Age: 73
End: 2018-11-26

## 2018-12-10 ENCOUNTER — LABORATORY RESULT (OUTPATIENT)
Age: 73
End: 2018-12-10

## 2018-12-10 ENCOUNTER — APPOINTMENT (OUTPATIENT)
Dept: INTERNAL MEDICINE | Facility: CLINIC | Age: 73
End: 2018-12-10
Payer: MEDICARE

## 2018-12-10 VITALS
BODY MASS INDEX: 30.36 KG/M2 | WEIGHT: 165 LBS | OXYGEN SATURATION: 97 % | TEMPERATURE: 98.4 F | DIASTOLIC BLOOD PRESSURE: 88 MMHG | SYSTOLIC BLOOD PRESSURE: 139 MMHG | HEIGHT: 62 IN | RESPIRATION RATE: 15 BRPM | HEART RATE: 96 BPM

## 2018-12-10 VITALS — SYSTOLIC BLOOD PRESSURE: 134 MMHG | DIASTOLIC BLOOD PRESSURE: 84 MMHG

## 2018-12-10 DIAGNOSIS — M54.5 LOW BACK PAIN: ICD-10-CM

## 2018-12-10 PROCEDURE — 99214 OFFICE O/P EST MOD 30 MIN: CPT

## 2018-12-10 NOTE — REVIEW OF SYSTEMS
[Frequency] : frequency [Negative] : Constitutional [Chest Pain] : no chest pain [Shortness Of Breath] : no shortness of breath

## 2018-12-10 NOTE — HISTORY OF PRESENT ILLNESS
[FreeTextEntry1] : Pt is present for follow-up. [de-identified] : 74 yo female with hx DM2, HTN, HPL here for f/u.  Also c/o increased urinary frequency over past 1-2 weeks, and intermittent R back pain. Reports pain is mild, worse with rotating or bending in certain positions, intermittent.  Denies fevers.  Reports compliance with all medications, however states she is still making some poor diet choices.  Eats fried foods, and eats later at night.  Does not do any formal exercise.  Takes Lantus at night and recently increased pre meal to 8 units.

## 2018-12-10 NOTE — PHYSICAL EXAM
[No Acute Distress] : no acute distress [Normal Sclera/Conjunctiva] : normal sclera/conjunctiva [EOMI] : extraocular movements intact [Normal Outer Ear/Nose] : the outer ears and nose were normal in appearance [No JVD] : no jugular venous distention [Supple] : supple [No Respiratory Distress] : no respiratory distress  [Clear to Auscultation] : lungs were clear to auscultation bilaterally [No Accessory Muscle Use] : no accessory muscle use [Normal Rate] : normal rate  [Regular Rhythm] : with a regular rhythm [Normal S1, S2] : normal S1 and S2 [No Edema] : there was no peripheral edema [Grossly Normal Strength/Tone] : grossly normal strength/tone [Normal Gait] : normal gait [Coordination Grossly Intact] : coordination grossly intact [No Focal Deficits] : no focal deficits [Normal Affect] : the affect was normal [Alert and Oriented x3] : oriented to person, place, and time [Normal Insight/Judgement] : insight and judgment were intact DISPLAY PLAN FREE TEXT

## 2018-12-10 NOTE — ASSESSMENT
[FreeTextEntry1] : 74 yo female here for f/u\par \par 1) back pain - likely 2/2 MSK given occurs with movement and intermittent.  However in setting increased urinary frequency, will check UA.\par \par 2) urinary frequency - check UA, also counseled on improved glucose control - decrease fried foods and eating at night. \par \par 3) HTN - chronic,s table, renew amlodipine and chlorthalidone.  Not on ace 2/2 lip swelling.  \par \par 4) DM2 - chronic, stable but not at goal.  counseled diet/exercise.  next a1c in 2 months.  c/w current insulin regimen and f/u endo.  medications renewed\par \par 5) HPL - chronic, stable and now much improved, renewed statin today\par \par

## 2018-12-11 LAB
APPEARANCE: CLEAR
BILIRUBIN URINE: NEGATIVE
BLOOD URINE: NEGATIVE
COLOR: YELLOW
GLUCOSE QUALITATIVE U: 100 MG/DL
KETONES URINE: NEGATIVE
LEUKOCYTE ESTERASE URINE: NEGATIVE
NITRITE URINE: NEGATIVE
PH URINE: 6.5
PROTEIN URINE: 30 MG/DL
SPECIFIC GRAVITY URINE: 1.02
UROBILINOGEN URINE: 1 MG/DL

## 2018-12-18 ENCOUNTER — APPOINTMENT (OUTPATIENT)
Dept: NEPHROLOGY | Facility: CLINIC | Age: 73
End: 2018-12-18
Payer: MEDICARE

## 2018-12-18 VITALS — SYSTOLIC BLOOD PRESSURE: 120 MMHG | HEART RATE: 84 BPM | DIASTOLIC BLOOD PRESSURE: 78 MMHG

## 2018-12-18 DIAGNOSIS — R35.0 FREQUENCY OF MICTURITION: ICD-10-CM

## 2018-12-18 PROCEDURE — 99214 OFFICE O/P EST MOD 30 MIN: CPT

## 2019-03-11 ENCOUNTER — APPOINTMENT (OUTPATIENT)
Dept: INTERNAL MEDICINE | Facility: CLINIC | Age: 74
End: 2019-03-11
Payer: MEDICARE

## 2019-03-11 VITALS
HEART RATE: 100 BPM | TEMPERATURE: 99.5 F | SYSTOLIC BLOOD PRESSURE: 138 MMHG | DIASTOLIC BLOOD PRESSURE: 85 MMHG | BODY MASS INDEX: 29.81 KG/M2 | HEIGHT: 62 IN | OXYGEN SATURATION: 98 % | WEIGHT: 162 LBS

## 2019-03-11 PROCEDURE — 36415 COLL VENOUS BLD VENIPUNCTURE: CPT

## 2019-03-11 PROCEDURE — G0439: CPT

## 2019-03-11 PROCEDURE — 93000 ELECTROCARDIOGRAM COMPLETE: CPT

## 2019-03-11 NOTE — PHYSICAL EXAM
[No Acute Distress] : no acute distress [Well-Appearing] : well-appearing [Normal Sclera/Conjunctiva] : normal sclera/conjunctiva [PERRL] : pupils equal round and reactive to light [EOMI] : extraocular movements intact [Normal Outer Ear/Nose] : the outer ears and nose were normal in appearance [Normal Oropharynx] : the oropharynx was normal [Normal TMs] : both tympanic membranes were normal [No JVD] : no jugular venous distention [Supple] : supple [No Lymphadenopathy] : no lymphadenopathy [No Respiratory Distress] : no respiratory distress  [Clear to Auscultation] : lungs were clear to auscultation bilaterally [No Accessory Muscle Use] : no accessory muscle use [Normal Rate] : normal rate  [Regular Rhythm] : with a regular rhythm [Normal S1, S2] : normal S1 and S2 [No Edema] : there was no peripheral edema [Soft] : abdomen soft [Non Tender] : non-tender [Non-distended] : non-distended [Normal Bowel Sounds] : normal bowel sounds [Normal Posterior Cervical Nodes] : no posterior cervical lymphadenopathy [Normal Anterior Cervical Nodes] : no anterior cervical lymphadenopathy [Grossly Normal Strength/Tone] : grossly normal strength/tone [No Rash] : no rash [Normal Gait] : normal gait [Coordination Grossly Intact] : coordination grossly intact [No Focal Deficits] : no focal deficits [Normal Affect] : the affect was normal [Alert and Oriented x3] : oriented to person, place, and time [Normal Insight/Judgement] : insight and judgment were intact

## 2019-03-11 NOTE — HISTORY OF PRESENT ILLNESS
[de-identified] : 74 yo female with hx DM2, HTN, HPL here for annual CPE.  Reports feeling generally well.  Requesting refill nystatin for occasional inguinal itching, currently controlled.  No other complaints.\par \par For CPE\par Eats healthy balanced diet during day but unhealthy at night - snacks, pork rinds\par no formal exercise.  walks daily\par last mammo sept 2018 normal\par bone density 2017 normal\par cscope 2017 with polyp - f/u 5 years, due 2022\par discussed shingles vaccine - pt will inquire about coverage with insurance.\par pneumo UTD\par

## 2019-03-11 NOTE — ASSESSMENT
[FreeTextEntry1] : 74 yo female here for CPE\par \par 1) HCM\par -counseled on healthy balanced diet. Encouraged lean protein, vegetables, healthy fats and whole grains.  counseled eliminate nighttime eat, or snack on crunchy vegetables if needed.\par - counseled 150min gentle CV exercise per week.  brisk walking.\par -last mammo sept 2018 normal\par -bone density 2017 normal\par -cscope 2017 with polyp - f/u 5 years, due 2022\par -discussed shingles vaccine - pt will inquire about coverage with insurance.\par -pneumo UTD\par - EKG sinus rhythm unchanged from prior\par - check cbc, cmp, lipids, tsh, a1c today

## 2019-03-11 NOTE — HEALTH RISK ASSESSMENT
[No falls in past year] : Patient reported no falls in the past year [0] : 2) Feeling down, depressed, or hopeless: Not at all (0) [Patient reported mammogram was normal] : Patient reported mammogram was normal [Patient reported bone density results were normal] : Patient reported bone density results were normal [Patient reported colonoscopy was normal] : Patient reported colonoscopy was normal [None] : None [Retired] : retired [Fully functional (bathing, dressing, toileting, transferring, walking, feeding)] : Fully functional (bathing, dressing, toileting, transferring, walking, feeding) [Fully functional (using the telephone, shopping, preparing meals, housekeeping, doing laundry, using] : Fully functional and needs no help or supervision to perform IADLs (using the telephone, shopping, preparing meals, housekeeping, doing laundry, using transportation, managing medications and managing finances) [] : No [de-identified] : walking.  no formal exercise [[id number]] : 0 [Language] : denies difficulty with language [Reports changes in hearing] : Reports no changes in hearing [MammogramDate] : 09/2018 [BoneDensityDate] : 2017 [ColonoscopyDate] : 2017 [de-identified] : mild decline in reading vision

## 2019-03-12 LAB
ALBUMIN SERPL ELPH-MCNC: 4.1 G/DL
ALP BLD-CCNC: 80 U/L
ALT SERPL-CCNC: 18 U/L
ANION GAP SERPL CALC-SCNC: 17 MMOL/L
AST SERPL-CCNC: 18 U/L
BASOPHILS # BLD AUTO: 0.1 K/UL
BASOPHILS NFR BLD AUTO: 1.1 %
BILIRUB SERPL-MCNC: 0.2 MG/DL
BUN SERPL-MCNC: 22 MG/DL
CALCIUM SERPL-MCNC: 9.8 MG/DL
CHLORIDE SERPL-SCNC: 102 MMOL/L
CHOLEST SERPL-MCNC: 133 MG/DL
CHOLEST/HDLC SERPL: 2.3 RATIO
CO2 SERPL-SCNC: 24 MMOL/L
CREAT SERPL-MCNC: 1.33 MG/DL
CREAT SPEC-SCNC: 118 MG/DL
EOSINOPHIL # BLD AUTO: 0.47 K/UL
EOSINOPHIL NFR BLD AUTO: 5.3 %
GLUCOSE SERPL-MCNC: 68 MG/DL
HBA1C MFR BLD HPLC: 8.6 %
HCT VFR BLD CALC: 41.6 %
HDLC SERPL-MCNC: 59 MG/DL
HGB BLD-MCNC: 12.8 G/DL
IMM GRANULOCYTES NFR BLD AUTO: 0.2 %
LDLC SERPL CALC-MCNC: 57 MG/DL
LYMPHOCYTES # BLD AUTO: 3.56 K/UL
LYMPHOCYTES NFR BLD AUTO: 40.3 %
MAN DIFF?: NORMAL
MCHC RBC-ENTMCNC: 27.9 PG
MCHC RBC-ENTMCNC: 30.8 GM/DL
MCV RBC AUTO: 90.8 FL
MICROALBUMIN 24H UR DL<=1MG/L-MCNC: 17.1 MG/DL
MICROALBUMIN/CREAT 24H UR-RTO: 146 MG/G
MONOCYTES # BLD AUTO: 0.67 K/UL
MONOCYTES NFR BLD AUTO: 7.6 %
NEUTROPHILS # BLD AUTO: 4.01 K/UL
NEUTROPHILS NFR BLD AUTO: 45.5 %
PLATELET # BLD AUTO: 295 K/UL
POTASSIUM SERPL-SCNC: 4.2 MMOL/L
PROT SERPL-MCNC: 7.1 G/DL
RBC # BLD: 4.58 M/UL
RBC # FLD: 14.2 %
SODIUM SERPL-SCNC: 143 MMOL/L
TRIGL SERPL-MCNC: 87 MG/DL
TSH SERPL-ACNC: 2.65 UIU/ML
WBC # FLD AUTO: 8.83 K/UL

## 2019-03-22 ENCOUNTER — APPOINTMENT (OUTPATIENT)
Dept: ENDOCRINOLOGY | Facility: CLINIC | Age: 74
End: 2019-03-22
Payer: MEDICARE

## 2019-03-22 VITALS
BODY MASS INDEX: 30.18 KG/M2 | HEIGHT: 62 IN | DIASTOLIC BLOOD PRESSURE: 91 MMHG | WEIGHT: 164 LBS | HEART RATE: 90 BPM | SYSTOLIC BLOOD PRESSURE: 143 MMHG

## 2019-03-22 PROCEDURE — 99214 OFFICE O/P EST MOD 30 MIN: CPT

## 2019-03-22 NOTE — PHYSICAL EXAM
[Alert] : alert [Healthy Appearance] : healthy appearance [Normal Voice/Communication] : normal voice communication [No Proptosis] : no proptosis [No Lid Lag] : no lid lag [Normal Hearing] : hearing was normal [Thyroid Not Enlarged] : the thyroid was not enlarged [No Thyroid Nodules] : there were no palpable thyroid nodules [Clear to Auscultation] : lungs were clear to auscultation bilaterally [Normal S1, S2] : normal S1 and S2 [Regular Rhythm] : with a regular rhythm [Pedal Pulses Normal] : the pedal pulses are present [No Edema] : there was no peripheral edema [Normal Sensation on Monofilament Testing] : normal sensation on monofilament testing of lower extremities [Normal Affect] : the affect was normal [Normal Mood] : the mood was normal [Foot Ulcers] : no foot ulcers [de-identified] : looks younger than stated age. fingerstick 130, post breakfast (oatmeal, 8 units) [de-identified] : no onychomycoses, marked acanthosis nigricans

## 2019-03-22 NOTE — DATA REVIEWED
[FreeTextEntry1] : 3/19: A1c 8.6%, tot chol 133, trig 87, HDL 59, LDL 57, TSH 2.65, Cr 1.33, urine microalbumin/cr 146\par 11/18: A1c 8.4%, tot chol 140, trig 85, HDL 59, LDL 64, urine microalbumin /cr 156\par 9/18: A1c 8.6%, tot chol 150, trig 92, HDL 56, LDL 76\par 7/18: A1c 8.5% point of care\par 4/18: A1c 8.8%, Cr 1.43, tot chol 214, trig 113, HDL 60, \par 10/17: A1c 7.8%, TSH 1.67\par 6/17: A1c 8.5%, urine microalb/cr 18\par 2/17: tot chol 122, trig 68, HDL 62, LDL 46, Cr 1.30, eGFR 42\par 12/16: A1c 8.3%, tot chol 196, trig 91, HDL 54, \par 9/16: A1c 8.1%\par 7/16: A1c 8.5%,  urine microalb/cr 96, TSH 2.52\par \par bone density 2/17, Hologic: \par L1-L4  1.060 T -0.8 (L1 T -1.2)\par tot hip 1.014, T -0.1\par fem neck 0.972, T 0.1

## 2019-03-22 NOTE — HISTORY OF PRESENT ILLNESS
[FreeTextEntry1] : still eating at night, but changed to carrots and celery with [place] dip.  typically eats 11p-midnight.  goes to sleep around 12a to 3am.\par stopped bupropion\par not exercising since August, but walks every day, usually 20 blocks or more\par forgot to bring meter but today was 82 in am.\par diet history: oatmeal with cinnamon today breakfast; chicken salad last night dinner.  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

## 2019-03-22 NOTE — ASSESSMENT
[FreeTextEntry1] : Diabetes with microalbuminuria, on ACEI. goal A1c < 7.5% \par  Reduce Lantus to 14 units daily since today's morning sugar is trending on the low side.\par Restart bupropion, start 1 tab hs for two weeks and then increase to 2 tab/day to reduce night cravings.\par Advised walking 10 min after each meal to help control post prandial glucose.\par advised eating earlier, and stop eating by 9pm.  go to bed at midnight, get more sleep (goal 7h/night)\par RTO 4 months\par

## 2019-04-18 ENCOUNTER — APPOINTMENT (OUTPATIENT)
Dept: NEPHROLOGY | Facility: CLINIC | Age: 74
End: 2019-04-18
Payer: MEDICARE

## 2019-04-18 VITALS
SYSTOLIC BLOOD PRESSURE: 122 MMHG | DIASTOLIC BLOOD PRESSURE: 72 MMHG | WEIGHT: 164 LBS | BODY MASS INDEX: 30 KG/M2 | HEART RATE: 76 BPM

## 2019-04-18 PROCEDURE — 99214 OFFICE O/P EST MOD 30 MIN: CPT

## 2019-04-18 NOTE — HISTORY OF PRESENT ILLNESS
[FreeTextEntry1] : 72 yo woman here for f/u evaluation of CKD 3, microalbuminuria, HTN, DMT2.\par Off CEI after allergic rxn spring 2018\par left cheek with some swelling relating to tooth probable left lower- for dental follow up\par urine frequency- improving- trying to get hr glucose controlled and also trying to drink less fluid in the evening\par A1C still up- back on bupropion as an appetite suppressant per endo\par still using NaHCO3- as an antacid- needs only a few times monthly\par No NSAIDs \par Denies flank pain, dysuria, hematuria or frothy urine \par  No shortness of breath, no chest pain\par

## 2019-04-18 NOTE — PHYSICAL EXAM
[General Appearance - Alert] : alert [General Appearance - In No Acute Distress] : in no acute distress [General Appearance - Well Nourished] : well nourished [General Appearance - Well Developed] : well developed [General Appearance - Well-Appearing] : healthy appearing [Sclera] : the sclera and conjunctiva were normal [PERRL With Normal Accommodation] : pupils were equal in size, round, and reactive to light [Extraocular Movements] : extraocular movements were intact [Outer Ear] : the ears and nose were normal in appearance [Examination Of The Oral Cavity] : the lips and gums were normal [Oropharynx] : the oropharynx was normal [Neck Appearance] : the appearance of the neck was normal [Jugular Venous Distention Increased] : there was no jugular-venous distention [Auscultation Breath Sounds / Voice Sounds] : lungs were clear to auscultation bilaterally [Exaggerated Use Of Accessory Muscles For Inspiration] : no accessory muscle use [Respiration, Rhythm And Depth] : normal respiratory rhythm and effort [Heart Rate And Rhythm] : heart rate was normal and rhythm regular [Heart Sounds] : normal S1 and S2 [Heart Sounds Gallop] : no gallops [Murmurs] : no murmurs [Heart Sounds Pericardial Friction Rub] : no pericardial rub [Full Pulse] : the pedal pulses are present [Edema] : there was no peripheral edema [Cervical Lymph Nodes Enlarged Posterior Bilaterally] : posterior cervical [Cervical Lymph Nodes Enlarged Anterior Bilaterally] : anterior cervical [Supraclavicular Lymph Nodes Enlarged Bilaterally] : supraclavicular [No CVA Tenderness] : no ~M costovertebral angle tenderness [Abnormal Walk] : normal gait [Nail Clubbing] : no clubbing  or cyanosis of the fingernails [Musculoskeletal - Swelling] : no joint swelling seen [Skin Color & Pigmentation] : normal skin color and pigmentation [Skin Turgor] : normal skin turgor [] : no rash [Cranial Nerves] : cranial nerves 2-12 were intact [Sensation] : the sensory exam was normal to light touch and pinprick [Deep Tendon Reflexes (DTR)] : deep tendon reflexes were 2+ and symmetric [Oriented To Time, Place, And Person] : oriented to person, place, and time [Impaired Insight] : insight and judgment were intact

## 2019-04-18 NOTE — ASSESSMENT
[FreeTextEntry1] : all lab data was reviewed with patient in detail from 11/16/2018 and 12/10/2018\par  72 yo woman  with CKD 3, microalbuminuria, HTN, uncontrolled DMT2 overweight\par CKD 3- creat  stable range 1.33- electrolytes good- (range 1.3- 1.53); BP, volume status acceptable\par reminded that poor glycemic control is a big factor in progression of CKD\par --albuminuria-  stable\par angioedema with lisinopril-  c/w same plan: avoid RAAS - if develops worsening proteinuria- consider allergy eval to see if candidate for ARB\par - HTN: BP at goal, c/w present meds\par - DMT2-: uncontrolled--A1C 8.6%-  as above- emphasized need for better glycemic control.\par - urine frequency- improved compared to last OV by her comments- probably related to less evening fluid intake as glucose still not controlled\par -hyperlipidemia- LP good, c/w Atorvastatin\par -overweight- continues on bupropion \par \par f/u OV 4 - 6 months

## 2019-05-07 ENCOUNTER — APPOINTMENT (OUTPATIENT)
Dept: OPHTHALMOLOGY | Facility: CLINIC | Age: 74
End: 2019-05-07
Payer: MEDICARE

## 2019-05-07 DIAGNOSIS — H43.813 VITREOUS DEGENERATION, BILATERAL: ICD-10-CM

## 2019-05-07 DIAGNOSIS — H40.053 OCULAR HYPERTENSION, BILATERAL: ICD-10-CM

## 2019-05-07 PROCEDURE — 92134 CPTRZ OPH DX IMG PST SGM RTA: CPT

## 2019-05-07 PROCEDURE — 92083 EXTENDED VISUAL FIELD XM: CPT

## 2019-05-07 PROCEDURE — 92012 INTRM OPH EXAM EST PATIENT: CPT

## 2019-05-07 PROCEDURE — 92226: CPT | Mod: LT

## 2019-06-27 ENCOUNTER — NON-APPOINTMENT (OUTPATIENT)
Age: 74
End: 2019-06-27

## 2019-06-27 ENCOUNTER — APPOINTMENT (OUTPATIENT)
Dept: OPHTHALMOLOGY | Facility: CLINIC | Age: 74
End: 2019-06-27
Payer: MEDICARE

## 2019-06-27 PROCEDURE — 76514 ECHO EXAM OF EYE THICKNESS: CPT

## 2019-06-27 PROCEDURE — 92250 FUNDUS PHOTOGRAPHY W/I&R: CPT

## 2019-06-27 PROCEDURE — 92020 GONIOSCOPY: CPT

## 2019-06-27 PROCEDURE — 92014 COMPRE OPH EXAM EST PT 1/>: CPT

## 2019-07-29 ENCOUNTER — APPOINTMENT (OUTPATIENT)
Dept: INTERNAL MEDICINE | Facility: CLINIC | Age: 74
End: 2019-07-29
Payer: MEDICARE

## 2019-07-29 VITALS
WEIGHT: 164 LBS | OXYGEN SATURATION: 96 % | HEART RATE: 82 BPM | HEIGHT: 62 IN | SYSTOLIC BLOOD PRESSURE: 135 MMHG | BODY MASS INDEX: 30.18 KG/M2 | TEMPERATURE: 98.6 F | DIASTOLIC BLOOD PRESSURE: 85 MMHG

## 2019-07-29 PROCEDURE — 99214 OFFICE O/P EST MOD 30 MIN: CPT | Mod: 25

## 2019-07-29 PROCEDURE — 36415 COLL VENOUS BLD VENIPUNCTURE: CPT

## 2019-08-01 ENCOUNTER — OTHER (OUTPATIENT)
Age: 74
End: 2019-08-01

## 2019-08-01 LAB
ANION GAP SERPL CALC-SCNC: 20 MMOL/L
BUN SERPL-MCNC: 23 MG/DL
CALCIUM SERPL-MCNC: 9.9 MG/DL
CHLORIDE SERPL-SCNC: 99 MMOL/L
CO2 SERPL-SCNC: 27 MMOL/L
CREAT SERPL-MCNC: 1.33 MG/DL
ESTIMATED AVERAGE GLUCOSE: 197 MG/DL
GLUCOSE SERPL-MCNC: 141 MG/DL
HBA1C MFR BLD HPLC: 8.5 %
POTASSIUM SERPL-SCNC: 4.5 MMOL/L
SODIUM SERPL-SCNC: 146 MMOL/L

## 2019-08-08 ENCOUNTER — APPOINTMENT (OUTPATIENT)
Dept: OPHTHALMOLOGY | Facility: CLINIC | Age: 74
End: 2019-08-08
Payer: MEDICARE

## 2019-08-08 ENCOUNTER — NON-APPOINTMENT (OUTPATIENT)
Age: 74
End: 2019-08-08

## 2019-08-08 PROCEDURE — 92012 INTRM OPH EXAM EST PATIENT: CPT

## 2019-11-13 ENCOUNTER — APPOINTMENT (OUTPATIENT)
Dept: ENDOCRINOLOGY | Facility: CLINIC | Age: 74
End: 2019-11-13
Payer: MEDICARE

## 2019-11-13 VITALS
SYSTOLIC BLOOD PRESSURE: 138 MMHG | DIASTOLIC BLOOD PRESSURE: 83 MMHG | BODY MASS INDEX: 30.18 KG/M2 | WEIGHT: 165 LBS | HEART RATE: 105 BPM

## 2019-11-13 PROCEDURE — 99214 OFFICE O/P EST MOD 30 MIN: CPT

## 2019-11-13 RX ORDER — BUPROPION HYDROCHLORIDE 100 MG/1
100 TABLET, FILM COATED, EXTENDED RELEASE ORAL TWICE DAILY
Qty: 60 | Refills: 5 | Status: DISCONTINUED | COMMUNITY
Start: 2018-04-06 | End: 2019-11-13

## 2019-11-13 NOTE — ASSESSMENT
[FreeTextEntry1] : Diabetes with microalbuminuria, on ACEI. Not controlled. goal A1c < 7.5% \par Add Trulicity 0.75mg /week to reduce appetite; I'm hoping it will reduce her night snacking.  I told her she is going to eat her way into dialysis if she doesn't curb the night eating.\par Trulicity pen demonstrated, and first dose given in office.  Advised to eat slowly to prevent nausea side effects.  If she keeps eating with Trulicity, that will also cause nausea.\par Change Lantus to Tresiba, 12 units hs.  continue Humalog, can take few extra units if eating extra (for holidays).\par RTO 3 months\par

## 2019-11-13 NOTE — DATA REVIEWED
[FreeTextEntry1] : 7/19: A1c 8.5%, Cr 1.33, GFR 46\par 3/19: A1c 8.6%, tot chol 133, trig 87, HDL 59, LDL 57, TSH 2.65, Cr 1.33, urine microalbumin/cr 146\par 11/18: A1c 8.4%, tot chol 140, trig 85, HDL 59, LDL 64, urine microalbumin /cr 156\par 9/18: A1c 8.6%, tot chol 150, trig 92, HDL 56, LDL 76\par 7/18: A1c 8.5% point of care\par 4/18: A1c 8.8%, Cr 1.43, tot chol 214, trig 113, HDL 60, \par 10/17: A1c 7.8%, TSH 1.67\par 6/17: A1c 8.5%, urine microalb/cr 18\par 2/17: tot chol 122, trig 68, HDL 62, LDL 46, Cr 1.30, eGFR 42\par 12/16: A1c 8.3%, tot chol 196, trig 91, HDL 54, \par 9/16: A1c 8.1%\par 7/16: A1c 8.5%,  urine microalb/cr 96, TSH 2.52\par \par bone density 2/17, Hologic: \par L1-L4  1.060 T -0.8 (L1 T -1.2)\par tot hip 1.014, T -0.1\par fem neck 0.972, T 0.1

## 2019-11-13 NOTE — PHYSICAL EXAM
[Alert] : alert [Healthy Appearance] : healthy appearance [No Proptosis] : no proptosis [Normal Voice/Communication] : normal voice communication [No Lid Lag] : no lid lag [Normal Hearing] : hearing was normal [Thyroid Not Enlarged] : the thyroid was not enlarged [Normal S1, S2] : normal S1 and S2 [Clear to Auscultation] : lungs were clear to auscultation bilaterally [No Thyroid Nodules] : there were no palpable thyroid nodules [Pedal Pulses Normal] : the pedal pulses are present [Regular Rhythm] : with a regular rhythm [No Edema] : there was no peripheral edema [Normal Affect] : the affect was normal [Normal Sensation on Monofilament Testing] : normal sensation on monofilament testing of lower extremities [Normal Mood] : the mood was normal [Foot Ulcers] : no foot ulcers [de-identified] : looks younger than stated age.  [de-identified] : reg tachy [de-identified] : no onychomycoses, marked acanthosis nigricans

## 2019-11-13 NOTE — HISTORY OF PRESENT ILLNESS
[FreeTextEntry1] : one touch ultra meter: 14d 213 (n30), 30d 217 (n69)  testing 2x/day\par 11am: 161 (today), 175, 221, 142.  150.\par 9p: 313 (hero sandwich), 185, 244, 317, 347, mostly in 200s.\par still eating a lot at night.  has dinner around 4-5p but then eats pork rinds during the night\par goes to bed around 1-2am.\par not walking much.\par declines flu vaccine today, says she may get it next week with primary doctor\par no polyuria, polydipsia, SOB, chest pain, or neuropathy symptoms. no palpitations.\par tried taking bupropion again, but got nausea.\par .\par Meds: Lantus 15 units hs\par Humalog 8 units for meals, 7 units for late night snack\par metformin 1g bid\par  chlorthalidone 25mg\par atorvastatin 20mg\par vitamin D\par pantoprazole\par Previous meds: lisinopril (lip swelling), Victoza (too expensive), bupropion (nausea)

## 2019-11-14 LAB
ALBUMIN SERPL ELPH-MCNC: 4.3 G/DL
ALP BLD-CCNC: 85 U/L
ALT SERPL-CCNC: 15 U/L
ANION GAP SERPL CALC-SCNC: 20 MMOL/L
AST SERPL-CCNC: 16 U/L
BILIRUB SERPL-MCNC: 0.3 MG/DL
BUN SERPL-MCNC: 30 MG/DL
CALCIUM SERPL-MCNC: 10.3 MG/DL
CHLORIDE SERPL-SCNC: 98 MMOL/L
CO2 SERPL-SCNC: 23 MMOL/L
CREAT SERPL-MCNC: 1.16 MG/DL
ESTIMATED AVERAGE GLUCOSE: 197 MG/DL
GLUCOSE SERPL-MCNC: 164 MG/DL
HBA1C MFR BLD HPLC: 8.5 %
POTASSIUM SERPL-SCNC: 4.5 MMOL/L
PROT SERPL-MCNC: 7.2 G/DL
SODIUM SERPL-SCNC: 141 MMOL/L

## 2019-11-18 ENCOUNTER — APPOINTMENT (OUTPATIENT)
Dept: INTERNAL MEDICINE | Facility: CLINIC | Age: 74
End: 2019-11-18
Payer: MEDICARE

## 2019-11-18 VITALS
OXYGEN SATURATION: 96 % | HEART RATE: 103 BPM | BODY MASS INDEX: 29.81 KG/M2 | TEMPERATURE: 98.6 F | WEIGHT: 162 LBS | SYSTOLIC BLOOD PRESSURE: 142 MMHG | DIASTOLIC BLOOD PRESSURE: 80 MMHG | HEIGHT: 62 IN

## 2019-11-18 DIAGNOSIS — R15.9 FULL INCONTINENCE OF FECES: ICD-10-CM

## 2019-11-18 PROCEDURE — 90662 IIV NO PRSV INCREASED AG IM: CPT

## 2019-11-18 PROCEDURE — G0008: CPT

## 2019-11-18 PROCEDURE — 99214 OFFICE O/P EST MOD 30 MIN: CPT | Mod: 25

## 2019-11-20 ENCOUNTER — RX RENEWAL (OUTPATIENT)
Age: 74
End: 2019-11-20

## 2019-12-09 ENCOUNTER — NON-APPOINTMENT (OUTPATIENT)
Age: 74
End: 2019-12-09

## 2019-12-09 ENCOUNTER — APPOINTMENT (OUTPATIENT)
Dept: OPHTHALMOLOGY | Facility: CLINIC | Age: 74
End: 2019-12-09
Payer: MEDICARE

## 2019-12-09 PROCEDURE — 92133 CPTRZD OPH DX IMG PST SGM ON: CPT

## 2019-12-09 PROCEDURE — 92083 EXTENDED VISUAL FIELD XM: CPT

## 2019-12-09 PROCEDURE — 92012 INTRM OPH EXAM EST PATIENT: CPT

## 2019-12-10 ENCOUNTER — OTHER (OUTPATIENT)
Age: 74
End: 2019-12-10

## 2019-12-27 ENCOUNTER — MEDICATION RENEWAL (OUTPATIENT)
Age: 74
End: 2019-12-27

## 2019-12-30 ENCOUNTER — APPOINTMENT (OUTPATIENT)
Dept: NEPHROLOGY | Facility: CLINIC | Age: 74
End: 2019-12-30
Payer: MEDICARE

## 2019-12-30 VITALS — HEART RATE: 88 BPM | SYSTOLIC BLOOD PRESSURE: 142 MMHG | DIASTOLIC BLOOD PRESSURE: 80 MMHG

## 2019-12-30 PROCEDURE — 99214 OFFICE O/P EST MOD 30 MIN: CPT

## 2019-12-30 NOTE — PHYSICAL EXAM
[General Appearance - Alert] : alert [General Appearance - Well Developed] : well developed [General Appearance - Well Nourished] : well nourished [General Appearance - In No Acute Distress] : in no acute distress [PERRL With Normal Accommodation] : pupils were equal in size, round, and reactive to light [General Appearance - Well-Appearing] : healthy appearing [Sclera] : the sclera and conjunctiva were normal [Extraocular Movements] : extraocular movements were intact [Outer Ear] : the ears and nose were normal in appearance [Examination Of The Oral Cavity] : the lips and gums were normal [Oropharynx] : the oropharynx was normal [Neck Appearance] : the appearance of the neck was normal [Jugular Venous Distention Increased] : there was no jugular-venous distention [Respiration, Rhythm And Depth] : normal respiratory rhythm and effort [Exaggerated Use Of Accessory Muscles For Inspiration] : no accessory muscle use [Auscultation Breath Sounds / Voice Sounds] : lungs were clear to auscultation bilaterally [Heart Rate And Rhythm] : heart rate was normal and rhythm regular [Heart Sounds Gallop] : no gallops [Heart Sounds] : normal S1 and S2 [Heart Sounds Pericardial Friction Rub] : no pericardial rub [Murmurs] : no murmurs [Edema] : there was no peripheral edema [Full Pulse] : the pedal pulses are present [Cervical Lymph Nodes Enlarged Anterior Bilaterally] : anterior cervical [Cervical Lymph Nodes Enlarged Posterior Bilaterally] : posterior cervical [No CVA Tenderness] : no ~M costovertebral angle tenderness [Supraclavicular Lymph Nodes Enlarged Bilaterally] : supraclavicular [Nail Clubbing] : no clubbing  or cyanosis of the fingernails [Abnormal Walk] : normal gait [Musculoskeletal - Swelling] : no joint swelling seen [Skin Color & Pigmentation] : normal skin color and pigmentation [Skin Turgor] : normal skin turgor [Cranial Nerves] : cranial nerves 2-12 were intact [] : no rash [Sensation] : the sensory exam was normal to light touch and pinprick [Deep Tendon Reflexes (DTR)] : deep tendon reflexes were 2+ and symmetric [Oriented To Time, Place, And Person] : oriented to person, place, and time [Impaired Insight] : insight and judgment were intact

## 2020-01-01 NOTE — HISTORY OF PRESENT ILLNESS
[FreeTextEntry1] : 73 yo woman here for f/u evaluation of CKD 3, microalbuminuria, HTN, DMT2.\par Off lantus- on tresiba and trulicity- A1C \par Off CEI after allergic rxn spring 2018\par urine frequency again less pronounced\par A1C  8.5%- no significant change, but recent changes to meds as above\par on bupropion as an appetite suppressant per endo\par No NSAIDs \par Denies flank pain, dysuria, hematuria or frothy urine \par No shortness of breath, no chest pain\par

## 2020-01-22 ENCOUNTER — NON-APPOINTMENT (OUTPATIENT)
Age: 75
End: 2020-01-22

## 2020-01-22 ENCOUNTER — APPOINTMENT (OUTPATIENT)
Dept: OPHTHALMOLOGY | Facility: CLINIC | Age: 75
End: 2020-01-22
Payer: MEDICARE

## 2020-01-22 PROCEDURE — 92012 INTRM OPH EXAM EST PATIENT: CPT

## 2020-01-22 PROCEDURE — 92202 OPSCPY EXTND ON/MAC DRAW: CPT

## 2020-01-22 PROCEDURE — 92134 CPTRZ OPH DX IMG PST SGM RTA: CPT

## 2020-01-29 ENCOUNTER — RX RENEWAL (OUTPATIENT)
Age: 75
End: 2020-01-29

## 2020-02-19 ENCOUNTER — APPOINTMENT (OUTPATIENT)
Dept: INTERNAL MEDICINE | Facility: CLINIC | Age: 75
End: 2020-02-19
Payer: MEDICARE

## 2020-02-19 VITALS
TEMPERATURE: 99.1 F | WEIGHT: 164 LBS | BODY MASS INDEX: 30.18 KG/M2 | OXYGEN SATURATION: 100 % | SYSTOLIC BLOOD PRESSURE: 153 MMHG | DIASTOLIC BLOOD PRESSURE: 83 MMHG | HEIGHT: 62 IN | HEART RATE: 90 BPM

## 2020-02-19 PROCEDURE — 99214 OFFICE O/P EST MOD 30 MIN: CPT | Mod: 25

## 2020-02-19 PROCEDURE — 93000 ELECTROCARDIOGRAM COMPLETE: CPT

## 2020-02-19 PROCEDURE — 36415 COLL VENOUS BLD VENIPUNCTURE: CPT

## 2020-02-21 LAB
ANION GAP SERPL CALC-SCNC: 14 MMOL/L
BUN SERPL-MCNC: 26 MG/DL
CALCIUM SERPL-MCNC: 10.4 MG/DL
CHLORIDE SERPL-SCNC: 102 MMOL/L
CO2 SERPL-SCNC: 26 MMOL/L
CREAT SERPL-MCNC: 1.19 MG/DL
ESTIMATED AVERAGE GLUCOSE: 192 MG/DL
GLUCOSE SERPL-MCNC: 102 MG/DL
HBA1C MFR BLD HPLC: 8.3 %
POTASSIUM SERPL-SCNC: 4.4 MMOL/L
SODIUM SERPL-SCNC: 142 MMOL/L

## 2020-02-26 ENCOUNTER — APPOINTMENT (OUTPATIENT)
Dept: ENDOCRINOLOGY | Facility: CLINIC | Age: 75
End: 2020-02-26
Payer: MEDICARE

## 2020-02-26 VITALS
SYSTOLIC BLOOD PRESSURE: 148 MMHG | HEART RATE: 101 BPM | WEIGHT: 164 LBS | HEIGHT: 62 IN | DIASTOLIC BLOOD PRESSURE: 82 MMHG | BODY MASS INDEX: 30.18 KG/M2

## 2020-02-26 PROCEDURE — 99214 OFFICE O/P EST MOD 30 MIN: CPT

## 2020-02-27 NOTE — DATA REVIEWED
[FreeTextEntry1] : 2/20: A1C 8.3%\par 11/19: A1c 8.5%\par 7/19: A1c 8.5%, Cr 1.33, GFR 46\par 3/19: A1c 8.6%, tot chol 133, trig 87, HDL 59, LDL 57, TSH 2.65, Cr 1.33, urine microalbumin/cr 146\par 11/18: A1c 8.4%, tot chol 140, trig 85, HDL 59, LDL 64, urine microalbumin /cr 156\par 9/18: A1c 8.6%, tot chol 150, trig 92, HDL 56, LDL 76\par 7/18: A1c 8.5% point of care\par 4/18: A1c 8.8%, Cr 1.43, tot chol 214, trig 113, HDL 60, \par 10/17: A1c 7.8%, TSH 1.67\par 6/17: A1c 8.5%, urine microalb/cr 18\par 2/17: tot chol 122, trig 68, HDL 62, LDL 46, Cr 1.30, eGFR 42\par 12/16: A1c 8.3%, tot chol 196, trig 91, HDL 54, \par 9/16: A1c 8.1%\par 7/16: A1c 8.5%,  urine microalb/cr 96, TSH 2.52\par \par bone density 2/17, Hologic: \par L1-L4  1.060 T -0.8 (L1 T -1.2)\par tot hip 1.014, T -0.1\par fem neck 0.972, T 0.1

## 2020-02-27 NOTE — PHYSICAL EXAM
[Alert] : alert [Healthy Appearance] : healthy appearance [Normal Voice/Communication] : normal voice communication [No Lid Lag] : no lid lag [No Proptosis] : no proptosis [Normal Hearing] : hearing was normal [Thyroid Not Enlarged] : the thyroid was not enlarged [No Thyroid Nodules] : there were no palpable thyroid nodules [Clear to Auscultation] : lungs were clear to auscultation bilaterally [Regular Rhythm] : with a regular rhythm [Normal S1, S2] : normal S1 and S2 [Pedal Pulses Normal] : the pedal pulses are present [No Edema] : there was no peripheral edema [Normal Sensation on Monofilament Testing] : normal sensation on monofilament testing of lower extremities [Normal Affect] : the affect was normal [Normal Mood] : the mood was normal [Foot Ulcers] : no foot ulcers [de-identified] : looks younger than stated age.   fingerstick 203, 2h pp breakfast (mini bagel and cream cheese, took 8 units) [de-identified] : no onychomycoses, marked acanthosis nigricans [de-identified] : reg tachy

## 2020-02-27 NOTE — ASSESSMENT
[FreeTextEntry1] : Diabetes with microalbuminuria, on ACEI. Not controlled. goal A1c < 7.5% \par continue Trulicity, will give her samples for the next month and then hopefully she will be able to get it at  lower cost for her.\par Advised pt that she has to eat after taking Humalog, or else glucose will go low.  If she is not eating a late night snack (which is what I prefer), then she should not take Humalog at night.\par Discussed breakfast alternatives to bagel: english muffin, egg, avocado toast, Greek yogurt.  I recommend against smoothies which can cause spike in glucose afterwards and usually take a large portion of fruits to make.\par Walking goal 10-20 blocks/day\par RTO 4 months\par

## 2020-02-27 NOTE — HISTORY OF PRESENT ILLNESS
[FreeTextEntry1] : forgot to bring her meter.  glucose 187 this morning,  had woken up at 2am and felt hypo, so drank some juice (had not tested her glucose).  wasn't sweating, but felt funny.  She had taken Humalog for late night snack and then didn't eat.\par says sugars are mostly 130-180 range in the mornings.\par Trulicity too expensive for her now because she hasn't met her deductible yet, but thinks maybe in another month she will have met her deductible.  she "loves" it. \par no polyuria, polydipsia, SOB, chest pain, or neuropathy symptoms. no palpitations.\par had one episode of chest pain 2 weeks ago, thinks was maybe due to reflux\par having cramps at night in her legs (thighs, calves, feet)\par tried taking bupropion again, but got nausea.\par .\par Meds: Tresiba 12 units/day\par Humalog 8 units for meals, 7 units for late night snack\par Trulicity 0.75mg/week, when she has it\par metformin 1g bid\par  chlorthalidone 25mg\par atorvastatin 20mg\par vitamin D\par pantoprazole\par Previous meds: lisinopril (lip swelling), Victoza (too expensive), bupropion (nausea)

## 2020-03-09 ENCOUNTER — APPOINTMENT (OUTPATIENT)
Dept: OPHTHALMOLOGY | Facility: CLINIC | Age: 75
End: 2020-03-09

## 2020-03-31 ENCOUNTER — APPOINTMENT (OUTPATIENT)
Dept: INTERNAL MEDICINE | Facility: CLINIC | Age: 75
End: 2020-03-31
Payer: MEDICARE

## 2020-03-31 DIAGNOSIS — R68.89 OTHER GENERAL SYMPTOMS AND SIGNS: ICD-10-CM

## 2020-03-31 DIAGNOSIS — R50.9 FEVER, UNSPECIFIED: ICD-10-CM

## 2020-03-31 PROCEDURE — G2012 BRIEF CHECK IN BY MD/QHP: CPT

## 2020-04-02 PROBLEM — R68.89 SUSPECTED 2019 NOVEL CORONAVIRUS INFECTION: Status: ACTIVE | Noted: 2020-03-31

## 2020-04-27 ENCOUNTER — APPOINTMENT (OUTPATIENT)
Dept: NEPHROLOGY | Facility: CLINIC | Age: 75
End: 2020-04-27
Payer: MEDICARE

## 2020-04-27 VITALS — HEART RATE: 96 BPM | SYSTOLIC BLOOD PRESSURE: 133 MMHG | DIASTOLIC BLOOD PRESSURE: 82 MMHG

## 2020-04-27 PROCEDURE — 99447 NTRPROF PH1/NTRNET/EHR 11-20: CPT

## 2020-04-27 RX ORDER — NYSTATIN 100000 1/G
100000 POWDER TOPICAL
Qty: 1 | Refills: 2 | Status: DISCONTINUED | COMMUNITY
Start: 2018-09-10 | End: 2020-04-27

## 2020-05-21 LAB
ANION GAP SERPL CALC-SCNC: 15 MMOL/L
APPEARANCE: CLEAR
BACTERIA: NEGATIVE
BILIRUBIN URINE: NEGATIVE
BLOOD URINE: NEGATIVE
BUN SERPL-MCNC: 23 MG/DL
CALCIUM SERPL-MCNC: 10 MG/DL
CHLORIDE SERPL-SCNC: 101 MMOL/L
CO2 SERPL-SCNC: 27 MMOL/L
COLOR: YELLOW
CREAT SERPL-MCNC: 1.09 MG/DL
CREAT SPEC-SCNC: 92 MG/DL
GLUCOSE QUALITATIVE U: NEGATIVE
GLUCOSE SERPL-MCNC: 238 MG/DL
HYALINE CASTS: 1 /LPF
KETONES URINE: NEGATIVE
LEUKOCYTE ESTERASE URINE: NEGATIVE
MICROALBUMIN 24H UR DL<=1MG/L-MCNC: 20.1 MG/DL
MICROALBUMIN/CREAT 24H UR-RTO: 218 MG/G
MICROSCOPIC-UA: NORMAL
NITRITE URINE: NEGATIVE
PH URINE: 6
POTASSIUM SERPL-SCNC: 5 MMOL/L
PROTEIN URINE: ABNORMAL
RED BLOOD CELLS URINE: 1 /HPF
SODIUM SERPL-SCNC: 142 MMOL/L
SPECIFIC GRAVITY URINE: 1.02
SQUAMOUS EPITHELIAL CELLS: 4 /HPF
UROBILINOGEN URINE: NORMAL
WHITE BLOOD CELLS URINE: 4 /HPF

## 2020-05-21 NOTE — HISTORY OF PRESENT ILLNESS
[Patient] : the patient [FreeTextEntry1] : Discussed with patient: You have chosen to receive care through the use of tele-media. It enables health care providers at different locations to provide safe, effective, and convenient care through the use of technology. Please note this is a billable encounter. As with any health care service, there are risks associated with the use of tele-media, including  issues. You understand that I cannot physically examine you and that you may need to come to the office to complete the assessment. \par --Patient agreed verbally and understands the risks and benefits of tele-media as explained. All questions regarding tele-media encounters were answered.\par \par 75 yo woman for f/u evaluation of CKD 3, microalbuminuria, HTN, DMT2.\par \par Reports having high fever and chills starting on 3/27 which lasted up to 4/8.  Denies other symptoms.  Had spoken to PCP and was told to take tylenol and hydrate.  Feeling well today.  \par BP good at home, uses wrist cuff.  Wt stable, in low 160s. \par Off lantus- on tresiba and trulicity- A1C 8.3% (Feb2020)\par had issue with bupropion (given for appetite suppression) - stopped\par Off CEI after allergic rxn (angioedema) spring 2018\par No NSAIDs.  Occasional omeprazole.\par Denies flank pain, dysuria, hematuria or frothy urine \par No swelling, shortness of breath, no chest pain\par

## 2020-05-21 NOTE — ASSESSMENT
[FreeTextEntry1] : all lab data was reviewed with patient in detail from 2/19/20\par  73 yo woman with CKD 3, microalbuminuria, HTN, uncontrolled DMT2, overweight\par -HTN- BP at goal.  Continue to monitor BP at home-  focus on lifestyle modification- no change in meds today\par -CKD 3- Cr 1.19 (Feb2020) acceptable; electrolytes and BP good.  \par -microalbuminuria-  previously stable- angioedema with lisinopril-  c/w same plan: avoid RAAS.  Recheck microalbumin now.  if develops worsening proteinuria- consider allergy eval to see if candidate for ARB\par -hematuria - prev hx, will recheck UA\par - DMT2-: uncontrolled--A1C 8.3%-  reemphasized good glycemic control in delaying progression of CKD. as above- needs better glycemic control.\par -hyperlipidemia- LP good, c/w Atorvastatin\par -GE reflux - recommended H2 blocker as alternative to PPI\par \par f/u 4 months \par \par addendum: renal function stable, however, rise in albuminuria.  referred to allergist to see if she is candidate for ARBs.

## 2020-06-10 ENCOUNTER — APPOINTMENT (OUTPATIENT)
Dept: ENDOCRINOLOGY | Facility: CLINIC | Age: 75
End: 2020-06-10
Payer: MEDICARE

## 2020-06-10 VITALS
DIASTOLIC BLOOD PRESSURE: 98 MMHG | HEART RATE: 91 BPM | HEIGHT: 62 IN | SYSTOLIC BLOOD PRESSURE: 162 MMHG | WEIGHT: 154 LBS | BODY MASS INDEX: 28.34 KG/M2

## 2020-06-10 PROCEDURE — 36415 COLL VENOUS BLD VENIPUNCTURE: CPT

## 2020-06-10 PROCEDURE — 99214 OFFICE O/P EST MOD 30 MIN: CPT | Mod: 25

## 2020-06-10 NOTE — ASSESSMENT
[FreeTextEntry1] : Diabetes with microalbuminuria, on ACEI. Not controlled. goal A1c < 7.5% \par Increase Trulicity to 1.5mg/week, and will reduce Humalog to 5-6 units with meals.\par I gave her a logbook for her to record meal intake; when sugar is over 200, I want her to put in the last meal that she ate, so she can know which foods make her sugars high (so she can avoid these foods, or eat less of it).\par continue statin therapy\par will check Covid Antibody\par RTO 4 months\par

## 2020-06-10 NOTE — PHYSICAL EXAM
[Alert] : alert [Healthy Appearance] : healthy appearance [No Proptosis] : no proptosis [No Lid Lag] : no lid lag [Normal Hearing] : hearing was normal [No LAD] : no lymphadenopathy [Thyroid Not Enlarged] : the thyroid was not enlarged [Clear to Auscultation] : lungs were clear to auscultation bilaterally [Normal S1, S2] : normal S1 and S2 [Regular Rhythm] : with a regular rhythm [No Edema] : no peripheral edema [Pedal Pulses Normal] : the pedal pulses are present [Normal Sensation on Monofilament Testing] : normal sensation on monofilament testing of lower extremities [Normal Affect] : the affect was normal [Normal Mood] : the mood was normal [Foot Ulcers] : no foot ulcers [de-identified] : no onychomycoses, moderate acanthosis nigricans

## 2020-06-10 NOTE — DATA REVIEWED
[FreeTextEntry1] : 11/19: A1c 8.5%, urine microalbumin/cr 218\par 2/20: A1C 8.3%\par 11/19: A1c 8.5%\par 7/19: A1c 8.5%, Cr 1.33, GFR 46\par 3/19: A1c 8.6%, tot chol 133, trig 87, HDL 59, LDL 57, TSH 2.65, Cr 1.33, urine microalbumin/cr 146\par 11/18: A1c 8.4%, tot chol 140, trig 85, HDL 59, LDL 64, urine microalbumin /cr 156\par 9/18: A1c 8.6%, tot chol 150, trig 92, HDL 56, LDL 76\par 7/18: A1c 8.5% point of care\par 4/18: A1c 8.8%, Cr 1.43, tot chol 214, trig 113, HDL 60, \par 10/17: A1c 7.8%, TSH 1.67\par 6/17: A1c 8.5%, urine microalb/cr 18\par 2/17: tot chol 122, trig 68, HDL 62, LDL 46, Cr 1.30, eGFR 42\par 12/16: A1c 8.3%, tot chol 196, trig 91, HDL 54, \par 9/16: A1c 8.1%\par 7/16: A1c 8.5%,  urine microalb/cr 96, TSH 2.52\par \par bone density 2/17, Hologic: \par L1-L4  1.060 T -0.8 (L1 T -1.2)\par tot hip 1.014, T -0.1\par fem neck 0.972, T 0.1

## 2020-06-10 NOTE — HISTORY OF PRESENT ILLNESS
[FreeTextEntry1] : Trulicity much cheaper through her mail order pharmacy.  weight is down 10lb since last visit.\par One touch ultra meter: 14d 183 (n30), 30d 177 (n62).  testing 3x/day\par Am: 135, 195, 204, 145, 144, 144\par PM: 111, 374, 201, 102, 187, 206, 51 (6p), 67 (1a), 223, 186, 415, 183\par has hypo awareness.  doesn't remember which foods made her sugars high.\par less active, walking less during pandemic.\par no polyuria, polydipsia, SOB, chest pain, or neuropathy symptoms. no palpitations.\par was sick in Feb until March 11, with fever.  ? Covid\par .\par Meds: Tresiba 12 units/day\par Humalog 8 units for meals, 7 units for late night snack\par Trulicity 0.75mg/week, when she has it (takes on Wed)\par metformin 1g bid\par  chlorthalidone 25mg\par atorvastatin 20mg\par vitamin D\par pantoprazole\par Previous meds: lisinopril (lip swelling), Victoza (too expensive), bupropion (nausea)

## 2020-06-15 LAB
ANION GAP SERPL CALC-SCNC: 16 MMOL/L
BUN SERPL-MCNC: 29 MG/DL
CALCIUM SERPL-MCNC: 10.9 MG/DL
CHLORIDE SERPL-SCNC: 100 MMOL/L
CHOLEST SERPL-MCNC: 142 MG/DL
CHOLEST/HDLC SERPL: 2.4 RATIO
CO2 SERPL-SCNC: 26 MMOL/L
CREAT SERPL-MCNC: 1.16 MG/DL
ESTIMATED AVERAGE GLUCOSE: 169 MG/DL
GLUCOSE SERPL-MCNC: 69 MG/DL
HBA1C MFR BLD HPLC: 7.5 %
HDLC SERPL-MCNC: 59 MG/DL
LDLC SERPL CALC-MCNC: 63 MG/DL
POTASSIUM SERPL-SCNC: 4.8 MMOL/L
SARS-COV-2 IGG SERPL IA-ACNC: 15.3 INDEX
SARS-COV-2 IGG SERPL QL IA: POSITIVE
SODIUM SERPL-SCNC: 142 MMOL/L
TRIGL SERPL-MCNC: 101 MG/DL
TSH SERPL-ACNC: 1.72 UIU/ML

## 2020-06-22 ENCOUNTER — APPOINTMENT (OUTPATIENT)
Dept: INTERNAL MEDICINE | Facility: CLINIC | Age: 75
End: 2020-06-22
Payer: MEDICARE

## 2020-06-22 VITALS
OXYGEN SATURATION: 96 % | HEIGHT: 62 IN | BODY MASS INDEX: 28.34 KG/M2 | SYSTOLIC BLOOD PRESSURE: 149 MMHG | HEART RATE: 97 BPM | WEIGHT: 154 LBS | DIASTOLIC BLOOD PRESSURE: 84 MMHG | TEMPERATURE: 99 F

## 2020-06-22 PROCEDURE — 99214 OFFICE O/P EST MOD 30 MIN: CPT

## 2020-06-26 ENCOUNTER — APPOINTMENT (OUTPATIENT)
Dept: HEART AND VASCULAR | Facility: CLINIC | Age: 75
End: 2020-06-26
Payer: MEDICARE

## 2020-06-26 ENCOUNTER — NON-APPOINTMENT (OUTPATIENT)
Age: 75
End: 2020-06-26

## 2020-06-26 VITALS
HEART RATE: 104 BPM | WEIGHT: 154.98 LBS | SYSTOLIC BLOOD PRESSURE: 140 MMHG | TEMPERATURE: 98.6 F | BODY MASS INDEX: 28.52 KG/M2 | DIASTOLIC BLOOD PRESSURE: 70 MMHG | OXYGEN SATURATION: 99 % | HEIGHT: 62 IN

## 2020-06-26 DIAGNOSIS — R07.89 OTHER CHEST PAIN: ICD-10-CM

## 2020-06-26 PROCEDURE — 93000 ELECTROCARDIOGRAM COMPLETE: CPT

## 2020-06-26 PROCEDURE — 99213 OFFICE O/P EST LOW 20 MIN: CPT

## 2020-06-26 NOTE — PHYSICAL EXAM
[General Appearance - Well Developed] : well developed [Normal Appearance] : normal appearance [General Appearance - Well Nourished] : well nourished [Well Groomed] : well groomed [No Deformities] : no deformities [General Appearance - In No Acute Distress] : no acute distress [Normal Conjunctiva] : the conjunctiva exhibited no abnormalities [Eyelids - No Xanthelasma] : the eyelids demonstrated no xanthelasmas [Normal Oral Mucosa] : normal oral mucosa [No Oral Pallor] : no oral pallor [No Oral Cyanosis] : no oral cyanosis [Normal Jugular Venous A Waves Present] : normal jugular venous A waves present [Normal Jugular Venous V Waves Present] : normal jugular venous V waves present [No Jugular Venous Vivar A Waves] : no jugular venous vivar A waves [Heart Rate And Rhythm] : heart rate and rhythm were normal [Heart Sounds] : normal S1 and S2 [Murmurs] : no murmurs present [Respiration, Rhythm And Depth] : normal respiratory rhythm and effort [Exaggerated Use Of Accessory Muscles For Inspiration] : no accessory muscle use [Auscultation Breath Sounds / Voice Sounds] : lungs were clear to auscultation bilaterally [Abdomen Soft] : soft [Abdomen Tenderness] : non-tender [Abdomen Mass (___ Cm)] : no abdominal mass palpated [Abnormal Walk] : normal gait [Gait - Sufficient For Exercise Testing] : the gait was sufficient for exercise testing [Nail Clubbing] : no clubbing of the fingernails [Cyanosis, Localized] : no localized cyanosis [Petechial Hemorrhages (___cm)] : no petechial hemorrhages [Skin Color & Pigmentation] : normal skin color and pigmentation [] : no rash [No Venous Stasis] : no venous stasis [Skin Lesions] : no skin lesions [No Skin Ulcers] : no skin ulcer [No Xanthoma] : no  xanthoma was observed [Oriented To Time, Place, And Person] : oriented to person, place, and time [Affect] : the affect was normal [Mood] : the mood was normal [No Anxiety] : not feeling anxious

## 2020-06-29 NOTE — ASSESSMENT
[FreeTextEntry1] : chest pain will do stress test and echo\par abnormal ECG will have her wear a monitor ? atrial tachycardia\par HTN acceptable on amlodipine and chlorthalidone allergic to ACE and ARB\par fu after testing

## 2020-06-29 NOTE — HISTORY OF PRESENT ILLNESS
[FreeTextEntry1] : 74 F CKD HTN HPL IDDM2 Allergy to lisinopril FACIAL SWELLING referred by Dr. Rivers for chest heaviness started February would come and go then got COVID now feels ok chest heaviness was substernal not sure what made it worse some made it better came and went would last for a few days waxing and waning not radiating \par \par \par ecg ? atrial tachycardia likely sinus rhythm at 100 bpm

## 2020-06-30 ENCOUNTER — APPOINTMENT (OUTPATIENT)
Dept: GASTROENTEROLOGY | Facility: CLINIC | Age: 75
End: 2020-06-30
Payer: MEDICARE

## 2020-06-30 VITALS
RESPIRATION RATE: 16 BRPM | WEIGHT: 153 LBS | DIASTOLIC BLOOD PRESSURE: 70 MMHG | HEIGHT: 62 IN | BODY MASS INDEX: 28.16 KG/M2 | OXYGEN SATURATION: 98 % | SYSTOLIC BLOOD PRESSURE: 120 MMHG | TEMPERATURE: 98.3 F | HEART RATE: 95 BPM

## 2020-06-30 DIAGNOSIS — K21.9 GASTRO-ESOPHAGEAL REFLUX DISEASE W/OUT ESOPHAGITIS: ICD-10-CM

## 2020-06-30 DIAGNOSIS — K30 FUNCTIONAL DYSPEPSIA: ICD-10-CM

## 2020-06-30 PROCEDURE — 83013 H PYLORI (C-13) BREATH: CPT

## 2020-06-30 PROCEDURE — 99214 OFFICE O/P EST MOD 30 MIN: CPT | Mod: 25

## 2020-07-01 PROBLEM — K21.9 ESOPHAGEAL REFLUX: Status: ACTIVE | Noted: 2020-07-01

## 2020-07-01 PROBLEM — K30 DYSPEPSIA: Status: ACTIVE | Noted: 2020-07-01

## 2020-07-01 LAB — UREA BREATH TEST QL: NEGATIVE

## 2020-07-01 NOTE — HISTORY OF PRESENT ILLNESS
[de-identified] : 74 Y F, here for GERD for the last 2-3 years. \par \par Reports GERD, symptoms of heartburn in the throat on occasion 1-2x/week at night. Reports occurs after eating late night snacks at 12am-1am (pork skins). She thinks it's gotten worse since jail. Notices + phlegm in her throat. Thick clear phlegm. No odynophagia or dysphagia. No unexpected weight loss (lost weight 2/2 Trulicity).\par She was on PPI on and off with improvement initially but reports ADR of nausea so she stopped. Unclear if she ever took H2 blocker. \par \par PMHx: DM2, HTN, HPL, CKD\par PSHx: Carcinoma insitu (vaginal) in 1974 \par Fam hx: no stomach cancer history \par Social hx: no tobacco use, quit 40 years ago; two beers/week, hard liquor once/month; no drug use or marijuana use; no NSAID use (only uses tylenol)

## 2020-07-01 NOTE — ASSESSMENT
[FreeTextEntry1] : 74 Y F, here for chronic GERD x 3 years, not on therapy, previously on PPI with relief but had nausea to medicine. No red flag s/s, no significant family history. \par \par GERD\par - given relief with PPI but experience nausea, will start with H2 40mg Famotidine, and if no relief consider alternative PPI like Dexilant \par - if no relief with PPI, consider EGD\par - Hpylori test today\par - long discussion regarding late night eating - she is often snacking on high fat foods at midnight and going to sleep around 1 AM; emphasized this is trigger and to consider seeing dietician for snacking recs and avoid eating 4 h before bed \par \par F/U 1 - 2 weeks via phone for update

## 2020-07-01 NOTE — PHYSICAL EXAM
[General Appearance - Alert] : alert [General Appearance - Well Nourished] : well nourished [General Appearance - In No Acute Distress] : in no acute distress [General Appearance - Well Developed] : well developed [Neck Appearance] : the appearance of the neck was normal [] : no respiratory distress [Respiration, Rhythm And Depth] : normal respiratory rhythm and effort [Exaggerated Use Of Accessory Muscles For Inspiration] : no accessory muscle use [Bowel Sounds] : normal bowel sounds [Heart Rate And Rhythm] : heart rate was normal and rhythm regular [Abdomen Soft] : soft [Abdomen Tenderness] : non-tender [Musculoskeletal - Swelling] : no joint swelling seen [Abnormal Walk] : normal gait [Impaired Insight] : insight and judgment were intact [Affect] : the affect was normal [Oriented To Time, Place, And Person] : oriented to person, place, and time

## 2020-07-14 ENCOUNTER — APPOINTMENT (OUTPATIENT)
Dept: HEART AND VASCULAR | Facility: CLINIC | Age: 75
End: 2020-07-14
Payer: MEDICARE

## 2020-07-14 VITALS — WEIGHT: 153 LBS | BODY MASS INDEX: 28.16 KG/M2 | HEIGHT: 62 IN

## 2020-07-14 PROCEDURE — 93015 CV STRESS TEST SUPVJ I&R: CPT

## 2020-07-14 PROCEDURE — ZZZZZ: CPT

## 2020-07-14 PROCEDURE — A9500: CPT

## 2020-07-14 PROCEDURE — 93306 TTE W/DOPPLER COMPLETE: CPT

## 2020-07-14 PROCEDURE — 78452 HT MUSCLE IMAGE SPECT MULT: CPT

## 2020-07-22 ENCOUNTER — APPOINTMENT (OUTPATIENT)
Dept: OPHTHALMOLOGY | Facility: CLINIC | Age: 75
End: 2020-07-22
Payer: MEDICARE

## 2020-07-22 ENCOUNTER — NON-APPOINTMENT (OUTPATIENT)
Age: 75
End: 2020-07-22

## 2020-07-22 PROCEDURE — 92134 CPTRZ OPH DX IMG PST SGM RTA: CPT

## 2020-07-22 PROCEDURE — 92014 COMPRE OPH EXAM EST PT 1/>: CPT

## 2020-07-27 ENCOUNTER — APPOINTMENT (OUTPATIENT)
Dept: OBGYN | Facility: CLINIC | Age: 75
End: 2020-07-27
Payer: MEDICARE

## 2020-07-27 VITALS
SYSTOLIC BLOOD PRESSURE: 110 MMHG | DIASTOLIC BLOOD PRESSURE: 70 MMHG | WEIGHT: 155 LBS | TEMPERATURE: 98.2 F | HEIGHT: 62 IN | BODY MASS INDEX: 28.52 KG/M2

## 2020-07-27 DIAGNOSIS — Z01.411 ENCOUNTER FOR GYNECOLOGICAL EXAMINATION (GENERAL) (ROUTINE) WITH ABNORMAL FINDINGS: ICD-10-CM

## 2020-07-27 DIAGNOSIS — L81.9 DISORDER OF PIGMENTATION, UNSPECIFIED: ICD-10-CM

## 2020-07-27 PROCEDURE — 99397 PER PM REEVAL EST PAT 65+ YR: CPT

## 2020-07-27 NOTE — PHYSICAL EXAM
[Awake] : awake [Alert] : alert [Soft] : soft [Oriented x3] : oriented to person, place, and time [No Bleeding] : there was no active vaginal bleeding [Normal] : uterus [Uterine Adnexae] : were not tender and not enlarged [Acute Distress] : no acute distress [Mass] : no breast mass [Nipple Discharge] : no nipple discharge [Axillary LAD] : no axillary lymphadenopathy [Tender] : non tender [de-identified] : inguinal skin lesions bilaterally, discoloration of the skin

## 2020-07-28 LAB — HPV HIGH+LOW RISK DNA PNL CVX: NOT DETECTED

## 2020-07-30 ENCOUNTER — RX RENEWAL (OUTPATIENT)
Age: 75
End: 2020-07-30

## 2020-08-31 ENCOUNTER — LABORATORY RESULT (OUTPATIENT)
Age: 75
End: 2020-08-31

## 2020-09-01 LAB
24R-OH-CALCIDIOL SERPL-MCNC: 61.3 PG/ML
25(OH)D3 SERPL-MCNC: 47.9 NG/ML
ALBUMIN SERPL ELPH-MCNC: 4.8 G/DL
ALP BLD-CCNC: 80 U/L
ALT SERPL-CCNC: 12 U/L
ANION GAP SERPL CALC-SCNC: 12 MMOL/L
APPEARANCE: CLEAR
AST SERPL-CCNC: 16 U/L
BASOPHILS # BLD AUTO: 0.04 K/UL
BASOPHILS NFR BLD AUTO: 0.5 %
BILIRUB SERPL-MCNC: 0.4 MG/DL
BILIRUBIN URINE: NEGATIVE
BLOOD URINE: NEGATIVE
BUN SERPL-MCNC: 23 MG/DL
CALCIUM SERPL-MCNC: 10.4 MG/DL
CALCIUM SERPL-MCNC: 10.4 MG/DL
CHLORIDE SERPL-SCNC: 100 MMOL/L
CO2 SERPL-SCNC: 31 MMOL/L
COLOR: YELLOW
CREAT SERPL-MCNC: 1.23 MG/DL
CREAT SPEC-SCNC: 176 MG/DL
EOSINOPHIL # BLD AUTO: 0.18 K/UL
EOSINOPHIL NFR BLD AUTO: 2.2 %
ESTIMATED AVERAGE GLUCOSE: 163 MG/DL
GLUCOSE QUALITATIVE U: NEGATIVE
GLUCOSE SERPL-MCNC: 129 MG/DL
HBA1C MFR BLD HPLC: 7.3 %
HCT VFR BLD CALC: 43.8 %
HGB BLD-MCNC: 14 G/DL
IMM GRANULOCYTES NFR BLD AUTO: 0.1 %
KETONES URINE: NEGATIVE
LEUKOCYTE ESTERASE URINE: NEGATIVE
LYMPHOCYTES # BLD AUTO: 3.66 K/UL
LYMPHOCYTES NFR BLD AUTO: 45 %
MAGNESIUM SERPL-MCNC: 1.7 MG/DL
MAN DIFF?: NORMAL
MCHC RBC-ENTMCNC: 28.4 PG
MCHC RBC-ENTMCNC: 32 GM/DL
MCV RBC AUTO: 88.8 FL
MICROALBUMIN 24H UR DL<=1MG/L-MCNC: 19.5 MG/DL
MICROALBUMIN/CREAT 24H UR-RTO: 111 MG/G
MONOCYTES # BLD AUTO: 0.47 K/UL
MONOCYTES NFR BLD AUTO: 5.8 %
NEUTROPHILS # BLD AUTO: 3.78 K/UL
NEUTROPHILS NFR BLD AUTO: 46.4 %
NITRITE URINE: NEGATIVE
PARATHYROID HORMONE INTACT: 34 PG/ML
PH URINE: 6
PHOSPHATE SERPL-MCNC: 3.6 MG/DL
PLATELET # BLD AUTO: 339 K/UL
POTASSIUM SERPL-SCNC: 4.5 MMOL/L
PROT SERPL-MCNC: 7.1 G/DL
PROTEIN URINE: ABNORMAL
RBC # BLD: 4.93 M/UL
RBC # FLD: 13.6 %
SODIUM SERPL-SCNC: 143 MMOL/L
SPECIFIC GRAVITY URINE: 1.02
URATE SERPL-MCNC: 5.3 MG/DL
UROBILINOGEN URINE: ABNORMAL
WBC # FLD AUTO: 8.14 K/UL

## 2020-09-03 ENCOUNTER — APPOINTMENT (OUTPATIENT)
Dept: NEPHROLOGY | Facility: CLINIC | Age: 75
End: 2020-09-03
Payer: MEDICARE

## 2020-09-03 VITALS
HEART RATE: 93 BPM | WEIGHT: 154 LBS | BODY MASS INDEX: 28.17 KG/M2 | DIASTOLIC BLOOD PRESSURE: 83 MMHG | SYSTOLIC BLOOD PRESSURE: 150 MMHG

## 2020-09-03 DIAGNOSIS — E66.9 OBESITY, UNSPECIFIED: ICD-10-CM

## 2020-09-03 PROCEDURE — 99214 OFFICE O/P EST MOD 30 MIN: CPT

## 2020-09-03 RX ORDER — FAMOTIDINE 40 MG/1
40 TABLET, FILM COATED ORAL DAILY
Qty: 90 | Refills: 3 | Status: DISCONTINUED | COMMUNITY
Start: 2020-06-30 | End: 2020-09-03

## 2020-09-03 NOTE — PHYSICAL EXAM
[General Appearance - Well Nourished] : well nourished [General Appearance - In No Acute Distress] : in no acute distress [General Appearance - Alert] : alert [General Appearance - Well-Appearing] : healthy appearing [Sclera] : the sclera and conjunctiva were normal [General Appearance - Well Developed] : well developed [Outer Ear] : the ears and nose were normal in appearance [Extraocular Movements] : extraocular movements were intact [PERRL With Normal Accommodation] : pupils were equal in size, round, and reactive to light [Neck Appearance] : the appearance of the neck was normal [Examination Of The Oral Cavity] : the lips and gums were normal [Oropharynx] : the oropharynx was normal [Jugular Venous Distention Increased] : there was no jugular-venous distention [Auscultation Breath Sounds / Voice Sounds] : lungs were clear to auscultation bilaterally [Exaggerated Use Of Accessory Muscles For Inspiration] : no accessory muscle use [Respiration, Rhythm And Depth] : normal respiratory rhythm and effort [Heart Sounds Gallop] : no gallops [Heart Sounds] : normal S1 and S2 [Heart Rate And Rhythm] : heart rate was normal and rhythm regular [Heart Sounds Pericardial Friction Rub] : no pericardial rub [Murmurs] : no murmurs [Full Pulse] : the pedal pulses are present [Edema] : there was no peripheral edema [Cervical Lymph Nodes Enlarged Anterior Bilaterally] : anterior cervical [Cervical Lymph Nodes Enlarged Posterior Bilaterally] : posterior cervical [No CVA Tenderness] : no ~M costovertebral angle tenderness [Supraclavicular Lymph Nodes Enlarged Bilaterally] : supraclavicular [Musculoskeletal - Swelling] : no joint swelling seen [Nail Clubbing] : no clubbing  or cyanosis of the fingernails [Abnormal Walk] : normal gait [Skin Turgor] : normal skin turgor [Skin Color & Pigmentation] : normal skin color and pigmentation [Cranial Nerves] : cranial nerves 2-12 were intact [] : no rash [Sensation] : the sensory exam was normal to light touch and pinprick [Oriented To Time, Place, And Person] : oriented to person, place, and time [Deep Tendon Reflexes (DTR)] : deep tendon reflexes were 2+ and symmetric [Impaired Insight] : insight and judgment were intact

## 2020-09-06 NOTE — ASSESSMENT
[FreeTextEntry1] : all lab data was reviewed with patient in detail from 8/31/2020\par  76 yo woman  with CKD 3, microalbuminuria, HTN, uncontrolled DMT2 overweight\par -HTN- BP above goal today, but most have been within goal-\par reviewed need for ongoing weight loss; limit salt intake\par will forward home BP readings- okay to monitor for now; if BP remains above goal, will adjust meds.\par - CKD 3- creat  acceptable 1.23; electrolytes WNL- c/w low protein diet\par --microalbuminuria-  stable- angioedema with lisinopril-  c/w same plan: avoid RAAS - if develops worsening proteinuria- consider allergy eval to see if candidate for ARB\par - DMT2-: A1C down to 7.3%- good improvement- c/w weight loss\par reminded that poor glycemic control is a big factor in progression of CKD.\par -hyperlipidemia- LP good, c/w Atorvastatin\par -overweight-  improving; continues on bupropion to suppress appetite\par \par f/u OV 4-5 months

## 2020-09-06 NOTE — HISTORY OF PRESENT ILLNESS
[FreeTextEntry1] : 74 yo woman with CKD 3, microalbuminuria, HTN, DMT2, here for f/u evaluation.\par Did contract COVID infection (3/22-4/4)- did not need hospitalization- was not tested at the time but + COVID Ab (6/10/20)  fevers malaise-\par continues on tresiba and trulicity\par has been able to lose weight\par No NSAIDs \par Denies flank pain, dysuria, hematuria or frothy urine \par No shortness of breath, no chest pain\par \par \par NB: Off CEI after allergic rxn spring 2018

## 2020-09-14 ENCOUNTER — APPOINTMENT (OUTPATIENT)
Dept: ENDOCRINOLOGY | Facility: CLINIC | Age: 75
End: 2020-09-14
Payer: MEDICARE

## 2020-09-14 VITALS
BODY MASS INDEX: 27.98 KG/M2 | DIASTOLIC BLOOD PRESSURE: 89 MMHG | WEIGHT: 153 LBS | HEART RATE: 105 BPM | SYSTOLIC BLOOD PRESSURE: 140 MMHG

## 2020-09-14 PROCEDURE — 99214 OFFICE O/P EST MOD 30 MIN: CPT

## 2020-09-14 RX ORDER — LATANOPROST/PF 0.005 %
0.01 DROPS OPHTHALMIC (EYE)
Qty: 8 | Refills: 0 | Status: ACTIVE | COMMUNITY
Start: 2019-12-09

## 2020-09-14 NOTE — ASSESSMENT
[FreeTextEntry1] : Diabetes with multiple complications, microalbuminuria on ACEI. A1c at  goal A1c now,  < 7.5% \par continue current regimen.   Reduce Tresiba to 11 units since having hypoglycemia when she has not eaten for many hours; she should not have to eat to maintain normal glucose level.\par Discussed adjusting rapid acting insulin based on meals--take higher dose when eating larger meals or higher carb meals, and reduce dose when eating less, to prevent hypoglycemia.   Stay with 8 units for regular meal, reduce to 6 units for smaller meal and increase to 10 units when eating out (eg cheese steak)\par walking goal, 30 min, 5x/week, ok to split into two 15 min walks.\par suggested capsaicin cream for mild neuropathy symptoms at night\par continue statin therapy\par RTO 4 months\par

## 2020-09-14 NOTE — PHYSICAL EXAM
[Alert] : alert [Healthy Appearance] : healthy appearance [No Proptosis] : no proptosis [Normal Hearing] : hearing was normal [No LAD] : no lymphadenopathy [No Lid Lag] : no lid lag [Thyroid Not Enlarged] : the thyroid was not enlarged [Clear to Auscultation] : lungs were clear to auscultation bilaterally [Normal S1, S2] : normal S1 and S2 [No Edema] : no peripheral edema [Regular Rhythm] : with a regular rhythm [Pedal Pulses Normal] : the pedal pulses are present [Normal Sensation on Monofilament Testing] : normal sensation on monofilament testing of lower extremities [Normal Affect] : the affect was normal [Normal Mood] : the mood was normal [Foot Ulcers] : no foot ulcers [de-identified] : no onychomycoses, moderate acanthosis nigricans  [de-identified] : reg tachy

## 2020-09-14 NOTE — HISTORY OF PRESENT ILLNESS
[FreeTextEntry1] : \par One touch ultra meter: 14d 178, 30d 170.  testing 3x/day\par 168 (today am), 173 (9p), 227 (1p), 160 (1p), 171 (1p), 122 (3a), 187 (10a), 226 (1a), 177 (8p), 78 (8p)\par goes low when she doesn't eat for 6 hours or more.  has hypo awareness/symptoms\par recently, she knows that shannon cowart makes glucose high.\par no polyuria, polydipsia, SOB, chest pain. no palpitations.\par last ophtho 7/22/20: mild-mod non proliferative DR\par having heartburn, saw GI, now on H2 blocker\par feels some heaviness in feet at night.\par planning to get flu vaccine with primary doctor in October\par .\par Meds: Tresiba 12 units/day\par Humalog 8 units for meals, 7 units for late night snack\par Trulicity 1.5mg/week, when she has it (takes on Wed)\par metformin 1g bid\par  chlorthalidone 25mg\par atorvastatin 20mg\par vitamin D\par pantoprazole\par Previous meds: lisinopril (lip swelling), Victoza (too expensive), bupropion (nausea)

## 2020-09-14 NOTE — DATA REVIEWED
[FreeTextEntry1] : 8/20: A1c 7.3%, urine microalbumin/cr 111, 25D 47.9\par 6/20: A1c 7.5%, TSH 1.72, Covid Ab (+)\par 11/19: A1c 8.5%, urine microalbumin/cr 218\par 2/20: A1C 8.3%\par 11/19: A1c 8.5%\par 7/19: A1c 8.5%, Cr 1.33, GFR 46\par 3/19: A1c 8.6%, tot chol 133, trig 87, HDL 59, LDL 57, TSH 2.65, Cr 1.33, urine microalbumin/cr 146\par 11/18: A1c 8.4%, tot chol 140, trig 85, HDL 59, LDL 64, urine microalbumin /cr 156\par 9/18: A1c 8.6%, tot chol 150, trig 92, HDL 56, LDL 76\par 7/18: A1c 8.5% point of care\par 4/18: A1c 8.8%, Cr 1.43, tot chol 214, trig 113, HDL 60, \par 10/17: A1c 7.8%, TSH 1.67\par 6/17: A1c 8.5%, urine microalb/cr 18\par 2/17: tot chol 122, trig 68, HDL 62, LDL 46, Cr 1.30, eGFR 42\par 12/16: A1c 8.3%, tot chol 196, trig 91, HDL 54, \par 9/16: A1c 8.1%\par 7/16: A1c 8.5%,  urine microalb/cr 96, TSH 2.52\par \par bone density 2/17, Hologic: \par L1-L4  1.060 T -0.8 (L1 T -1.2)\par tot hip 1.014, T -0.1\par fem neck 0.972, T 0.1

## 2020-10-21 ENCOUNTER — MED ADMIN CHARGE (OUTPATIENT)
Age: 75
End: 2020-10-21

## 2020-10-21 ENCOUNTER — APPOINTMENT (OUTPATIENT)
Dept: INTERNAL MEDICINE | Facility: CLINIC | Age: 75
End: 2020-10-21
Payer: MEDICARE

## 2020-10-21 VITALS — DIASTOLIC BLOOD PRESSURE: 80 MMHG | SYSTOLIC BLOOD PRESSURE: 148 MMHG

## 2020-10-21 VITALS
HEIGHT: 62 IN | HEART RATE: 103 BPM | BODY MASS INDEX: 28.57 KG/M2 | SYSTOLIC BLOOD PRESSURE: 152 MMHG | DIASTOLIC BLOOD PRESSURE: 88 MMHG | WEIGHT: 155.25 LBS | OXYGEN SATURATION: 100 % | TEMPERATURE: 97.9 F

## 2020-10-21 DIAGNOSIS — R21 RASH AND OTHER NONSPECIFIC SKIN ERUPTION: ICD-10-CM

## 2020-10-21 PROCEDURE — G0008: CPT

## 2020-10-21 PROCEDURE — G0439: CPT

## 2020-10-21 PROCEDURE — 99072 ADDL SUPL MATRL&STAF TM PHE: CPT

## 2020-10-21 PROCEDURE — 90662 IIV NO PRSV INCREASED AG IM: CPT

## 2020-11-09 ENCOUNTER — RX RENEWAL (OUTPATIENT)
Age: 75
End: 2020-11-09

## 2020-12-15 PROBLEM — Z86.19 HISTORY OF CANDIDIASIS OF VAGINA: Status: RESOLVED | Noted: 2017-03-09 | Resolved: 2020-12-15

## 2021-01-11 ENCOUNTER — APPOINTMENT (OUTPATIENT)
Dept: ENDOCRINOLOGY | Facility: CLINIC | Age: 76
End: 2021-01-11
Payer: MEDICARE

## 2021-01-11 VITALS
SYSTOLIC BLOOD PRESSURE: 153 MMHG | BODY MASS INDEX: 28.52 KG/M2 | WEIGHT: 155 LBS | HEART RATE: 111 BPM | DIASTOLIC BLOOD PRESSURE: 88 MMHG | HEIGHT: 62 IN

## 2021-01-11 LAB — HBA1C MFR BLD HPLC: 7.7

## 2021-01-11 PROCEDURE — 83036 HEMOGLOBIN GLYCOSYLATED A1C: CPT | Mod: QW

## 2021-01-11 PROCEDURE — 99214 OFFICE O/P EST MOD 30 MIN: CPT | Mod: 25

## 2021-01-11 PROCEDURE — 99072 ADDL SUPL MATRL&STAF TM PHE: CPT

## 2021-01-12 NOTE — ASSESSMENT
[FreeTextEntry1] : Diabetes with multiple complications, microalbuminuria on ACEI. A1c at  goal A1c now,  < 7.5% \par Suboptimal control due to night eating.\par continue insulin regimen, Trulicity.   Advised pt to ask insurance to compare cost of Trulicity vs Ozempic; if Ozempic is cheaper, can change to Ozempic\par Advised pt to stop eating by 9pm and avoid bagels and juice.  Should only have juice to treat hypoglycemia, otherwise juice causes hyperglycemia.  Substitute bagel with english muffin.\par Recommended increasing physical activity; suggested doing exercises at home\par Discussed trying CGM to facilitate frequent glucose testing, but at this time, she is not interested in CGM.  She needs  frequent glucose monitoring to determine insulin dosing and monitor for hypoglycemia. \par continue statin therapy\par RTO 4 months\par

## 2021-01-12 NOTE — HISTORY OF PRESENT ILLNESS
[FreeTextEntry1] : \par One touch ultra meter: 14d 186  30d 196 .  testing 3-4x/day\par highest sugars are at night (10p to midnight) due to eating at night . Eating out of boredom, usually has a sandwich and she does take Humalog for the late night eating\par 222 (12a), 135 (2p), 174 (9p), 152 (11a), 326 (4p), 154 (3p), 300 (10p), 103 (2p), 241 (7p), 133 (5p)\par no hypoglycemia \par today, ate half bagel and orange juice (which she normally does not have) for breakfast\par no polyuria, polydipsia, SOB, chest pain. no palpitations.\par last ophtho 7/22/20: mild-mod non proliferative DR\par .\par Meds: Tresiba 11 units/day\par Humalog 6-8 units for meals, 7 units for late night snack\par Trulicity 1.5mg/week, when she has it (takes on Wed)\par metformin 1g bid \par chlorthalidone 25mg\par atorvastatin 20mg\par vitamin D\par pantoprazole\par Previous meds: lisinopril (lip swelling), Victoza (too expensive), bupropion (nausea)

## 2021-01-12 NOTE — PHYSICAL EXAM
[Alert] : alert [Healthy Appearance] : healthy appearance [No Proptosis] : no proptosis [No Lid Lag] : no lid lag [Normal Hearing] : hearing was normal [No LAD] : no lymphadenopathy [Thyroid Not Enlarged] : the thyroid was not enlarged [Clear to Auscultation] : lungs were clear to auscultation bilaterally [Normal S1, S2] : normal S1 and S2 [Regular Rhythm] : with a regular rhythm [No Edema] : no peripheral edema [Pedal Pulses Normal] : the pedal pulses are present [Normal Sensation on Monofilament Testing] : normal sensation on monofilament testing of lower extremities [Normal Affect] : the affect was normal [Normal Mood] : the mood was normal [Foot Ulcers] : no foot ulcers [de-identified] : fingerstick 260, half RADHA machado, 3hpp [de-identified] : no onychomycoses, moderate acanthosis nigricans

## 2021-01-12 NOTE — DATA REVIEWED
[FreeTextEntry1] : 1/21: A1c 7.7%\par 8/20: A1c 7.3%, urine microalbumin/cr 111, 25D 47.9\par 6/20: A1c 7.5%, TSH 1.72, Covid Ab (+)\par 11/19: A1c 8.5%, urine microalbumin/cr 218\par 2/20: A1C 8.3%\par 11/19: A1c 8.5%\par 7/19: A1c 8.5%, Cr 1.33, GFR 46\par 3/19: A1c 8.6%, tot chol 133, trig 87, HDL 59, LDL 57, TSH 2.65, Cr 1.33, urine microalbumin/cr 146\par 11/18: A1c 8.4%, tot chol 140, trig 85, HDL 59, LDL 64, urine microalbumin /cr 156\par 9/18: A1c 8.6%, tot chol 150, trig 92, HDL 56, LDL 76\par 7/18: A1c 8.5% point of care\par 4/18: A1c 8.8%, Cr 1.43, tot chol 214, trig 113, HDL 60, \par 10/17: A1c 7.8%, TSH 1.67\par 6/17: A1c 8.5%, urine microalb/cr 18\par 2/17: tot chol 122, trig 68, HDL 62, LDL 46, Cr 1.30, eGFR 42\par 12/16: A1c 8.3%, tot chol 196, trig 91, HDL 54, \par 9/16: A1c 8.1%\par 7/16: A1c 8.5%,  urine microalb/cr 96, TSH 2.52\par \par bone density 2/17, Hologic: \par L1-L4  1.060 T -0.8 (L1 T -1.2)\par tot hip 1.014, T -0.1\par fem neck 0.972, T 0.1

## 2021-01-27 ENCOUNTER — APPOINTMENT (OUTPATIENT)
Dept: INTERNAL MEDICINE | Facility: CLINIC | Age: 76
End: 2021-01-27
Payer: MEDICARE

## 2021-01-27 VITALS
HEIGHT: 62 IN | HEART RATE: 100 BPM | SYSTOLIC BLOOD PRESSURE: 156 MMHG | WEIGHT: 155 LBS | DIASTOLIC BLOOD PRESSURE: 96 MMHG | TEMPERATURE: 97.6 F | OXYGEN SATURATION: 97 % | BODY MASS INDEX: 28.52 KG/M2

## 2021-01-27 DIAGNOSIS — Z11.59 ENCOUNTER FOR SCREENING FOR OTHER VIRAL DISEASES: ICD-10-CM

## 2021-01-27 PROCEDURE — 99072 ADDL SUPL MATRL&STAF TM PHE: CPT

## 2021-01-27 PROCEDURE — 36415 COLL VENOUS BLD VENIPUNCTURE: CPT

## 2021-01-27 PROCEDURE — 99214 OFFICE O/P EST MOD 30 MIN: CPT | Mod: 25

## 2021-01-28 ENCOUNTER — NON-APPOINTMENT (OUTPATIENT)
Age: 76
End: 2021-01-28

## 2021-01-28 LAB
ALBUMIN SERPL ELPH-MCNC: 4.7 G/DL
ALP BLD-CCNC: 98 U/L
ALT SERPL-CCNC: 14 U/L
ANION GAP SERPL CALC-SCNC: 15 MMOL/L
AST SERPL-CCNC: 19 U/L
BILIRUB SERPL-MCNC: 0.3 MG/DL
BUN SERPL-MCNC: 26 MG/DL
CALCIUM SERPL-MCNC: 10.3 MG/DL
CHLORIDE SERPL-SCNC: 100 MMOL/L
CO2 SERPL-SCNC: 25 MMOL/L
CREAT SERPL-MCNC: 1.25 MG/DL
ESTIMATED AVERAGE GLUCOSE: 186 MG/DL
GLUCOSE SERPL-MCNC: 44 MG/DL
HBA1C MFR BLD HPLC: 8.1 %
POTASSIUM SERPL-SCNC: 4.1 MMOL/L
PROT SERPL-MCNC: 7.3 G/DL
SARS-COV-2 IGG SERPL IA-ACNC: 64.9 INDEX
SARS-COV-2 IGG SERPL QL IA: POSITIVE
SODIUM SERPL-SCNC: 140 MMOL/L

## 2021-02-17 ENCOUNTER — RX RENEWAL (OUTPATIENT)
Age: 76
End: 2021-02-17

## 2021-03-11 ENCOUNTER — APPOINTMENT (OUTPATIENT)
Dept: NEPHROLOGY | Facility: CLINIC | Age: 76
End: 2021-03-11
Payer: MEDICARE

## 2021-03-11 VITALS — HEART RATE: 84 BPM | SYSTOLIC BLOOD PRESSURE: 124 MMHG | DIASTOLIC BLOOD PRESSURE: 74 MMHG

## 2021-03-11 VITALS — WEIGHT: 158 LBS | BODY MASS INDEX: 28.9 KG/M2

## 2021-03-11 PROCEDURE — 99213 OFFICE O/P EST LOW 20 MIN: CPT

## 2021-03-11 PROCEDURE — 99072 ADDL SUPL MATRL&STAF TM PHE: CPT

## 2021-03-11 NOTE — PHYSICAL EXAM
[General Appearance - Alert] : alert [General Appearance - In No Acute Distress] : in no acute distress [General Appearance - Well Nourished] : well nourished [General Appearance - Well Developed] : well developed [General Appearance - Well-Appearing] : healthy appearing [] : no respiratory distress [Respiration, Rhythm And Depth] : normal respiratory rhythm and effort [Exaggerated Use Of Accessory Muscles For Inspiration] : no accessory muscle use [Auscultation Breath Sounds / Voice Sounds] : lungs were clear to auscultation bilaterally [Heart Rate And Rhythm] : heart rate was normal and rhythm regular [Heart Sounds] : normal S1 and S2 [Heart Sounds Gallop] : no gallops [Murmurs] : no murmurs [Heart Sounds Pericardial Friction Rub] : no pericardial rub [Full Pulse] : the pedal pulses are present [Edema] : there was no peripheral edema [Cervical Lymph Nodes Enlarged Posterior Bilaterally] : posterior cervical [Cervical Lymph Nodes Enlarged Anterior Bilaterally] : anterior cervical [Supraclavicular Lymph Nodes Enlarged Bilaterally] : supraclavicular [Oriented To Time, Place, And Person] : oriented to person, place, and time [Impaired Insight] : insight and judgment were intact

## 2021-03-12 LAB
APPEARANCE: CLEAR
BACTERIA: ABNORMAL
BILIRUBIN URINE: NEGATIVE
BLOOD URINE: NEGATIVE
COLOR: NORMAL
CREAT SPEC-SCNC: 144 MG/DL
GLUCOSE QUALITATIVE U: NEGATIVE
HYALINE CASTS: 4 /LPF
KETONES URINE: NEGATIVE
LEUKOCYTE ESTERASE URINE: NEGATIVE
MICROALBUMIN 24H UR DL<=1MG/L-MCNC: 18.3 MG/DL
MICROALBUMIN/CREAT 24H UR-RTO: 127 MG/G
MICROSCOPIC-UA: NORMAL
NITRITE URINE: NEGATIVE
PH URINE: 6
PROTEIN URINE: ABNORMAL
RED BLOOD CELLS URINE: 3 /HPF
SPECIFIC GRAVITY URINE: 1.02
SQUAMOUS EPITHELIAL CELLS: 9 /HPF
UROBILINOGEN URINE: NORMAL
WHITE BLOOD CELLS URINE: 11 /HPF

## 2021-03-14 NOTE — HISTORY OF PRESENT ILLNESS
[FreeTextEntry1] : 74 yo woman here for f/u evaluation of CKD 3, microalbuminuria, HTN, DMT2.\par no change to meds\par Fully recovered from her COVID infection last summer\par weight back up a few pounds to 158 from 155- is mindful and plans to focus on weight loss again this spring\par on tresiba and trulicity\par No NSAIDs \par Denies flank pain, dysuria, hematuria or frothy urine \par No shortness of breath, no chest pain\par \par \par NB: Off CEI after allergic rxn spring 2018

## 2021-03-14 NOTE — ASSESSMENT
[FreeTextEntry1] : all lab data was reviewed with patient in detail from 1/27/2021 and 3/11/2021\par  76 yo woman  with CKD 3, microalbuminuria, HTN, uncontrolled DMT2 overweight\par -HTN- BP controlled- c/w present regimen; reviewed need for ongoing weight loss; limit salt intake\par - CKD 3- creat stable 1.25; electrolytes WNL; - c/w low protein diet, avoiding NSAIDs\par --microalbuminuria-- acceptable 124\par NB: angioedema with lisinopril-  c/w same plan: avoid RAAS - if develops worsening proteinuria- consider allergy eval to see if candidate for ARB\par - DMT2-: A1C trending up- 8.1%- aware that she needs to get level closer to 7%- working on lifestyle modification\par again discussed that will have better renal prognosis with optimal glycemic control \par -hyperlipidemia- LP at goal, c/w Atorvastatin\par -overweight-  trending up again- emphasized need to lose weight- target 10 pound decrease by summer\par \par f/u OV 4-5 months

## 2021-04-20 ENCOUNTER — NON-APPOINTMENT (OUTPATIENT)
Age: 76
End: 2021-04-20

## 2021-04-20 ENCOUNTER — APPOINTMENT (OUTPATIENT)
Dept: OPHTHALMOLOGY | Facility: CLINIC | Age: 76
End: 2021-04-20
Payer: MEDICARE

## 2021-04-20 PROCEDURE — 92134 CPTRZ OPH DX IMG PST SGM RTA: CPT

## 2021-04-20 PROCEDURE — 92012 INTRM OPH EXAM EST PATIENT: CPT

## 2021-04-20 PROCEDURE — 99072 ADDL SUPL MATRL&STAF TM PHE: CPT

## 2021-04-28 ENCOUNTER — APPOINTMENT (OUTPATIENT)
Dept: INTERNAL MEDICINE | Facility: CLINIC | Age: 76
End: 2021-04-28
Payer: MEDICARE

## 2021-04-28 VITALS
TEMPERATURE: 98.2 F | HEART RATE: 98 BPM | BODY MASS INDEX: 28.71 KG/M2 | HEIGHT: 62 IN | OXYGEN SATURATION: 95 % | SYSTOLIC BLOOD PRESSURE: 148 MMHG | WEIGHT: 156 LBS | DIASTOLIC BLOOD PRESSURE: 90 MMHG

## 2021-04-28 PROCEDURE — 99214 OFFICE O/P EST MOD 30 MIN: CPT

## 2021-04-28 PROCEDURE — 99072 ADDL SUPL MATRL&STAF TM PHE: CPT

## 2021-05-03 ENCOUNTER — APPOINTMENT (OUTPATIENT)
Dept: ENDOCRINOLOGY | Facility: CLINIC | Age: 76
End: 2021-05-03
Payer: MEDICARE

## 2021-05-03 VITALS
DIASTOLIC BLOOD PRESSURE: 69 MMHG | HEART RATE: 98 BPM | WEIGHT: 156 LBS | SYSTOLIC BLOOD PRESSURE: 113 MMHG | BODY MASS INDEX: 28.53 KG/M2

## 2021-05-03 PROCEDURE — 99072 ADDL SUPL MATRL&STAF TM PHE: CPT

## 2021-05-03 PROCEDURE — 99214 OFFICE O/P EST MOD 30 MIN: CPT

## 2021-05-03 NOTE — PHYSICAL EXAM
[Alert] : alert [No Acute Distress] : no acute distress [No Proptosis] : no proptosis [No Lid Lag] : no lid lag [Normal Hearing] : hearing was normal [No LAD] : no lymphadenopathy [Thyroid Not Enlarged] : the thyroid was not enlarged [Clear to Auscultation] : lungs were clear to auscultation bilaterally [Normal S1, S2] : normal S1 and S2 [Regular Rhythm] : with a regular rhythm [No Edema] : no peripheral edema [Pedal Pulses Normal] : the pedal pulses are present [Normal Sensation on Monofilament Testing] : normal sensation on monofilament testing of lower extremities [Normal Affect] : the affect was normal [Normal Mood] : the mood was normal [Foot Ulcers] : no foot ulcers [de-identified] : no onychomycoses, mild to moderate acanthosis nigricans

## 2021-05-03 NOTE — ASSESSMENT
[FreeTextEntry1] : Diabetes with multiple complications, microalbuminuria on ACEI, suboptimal control.\par Suboptimal control due to night eating.\par Advised to stop eating at night, at least during the weekdays.   Night eating is likely causing hyperglycemia leading to nocturia, so nocturia will improve if she stops snacking at night.   Sugar control is not likely to improve until she improves her eating habits.\par offered to start Armin CGM, but says she is not ready yet, will continue with fingersticks.\par Trulicity samples given (she is in medicare gap)\par continue statin therapy\par check labs next visit\par RTO 4 months\par

## 2021-05-03 NOTE — DATA REVIEWED
[FreeTextEntry1] : 3/21: urine microalbumin/cr 127\par 1/21: A1c 7.7%\par 8/20: A1c 7.3%, urine microalbumin/cr 111, 25D 47.9\par 6/20: A1c 7.5%, TSH 1.72, Covid Ab (+)\par 11/19: A1c 8.5%, urine microalbumin/cr 218\par 2/20: A1C 8.3%\par 11/19: A1c 8.5%\par 7/19: A1c 8.5%, Cr 1.33, GFR 46\par 3/19: A1c 8.6%, tot chol 133, trig 87, HDL 59, LDL 57, TSH 2.65, Cr 1.33, urine microalbumin/cr 146\par 11/18: A1c 8.4%, tot chol 140, trig 85, HDL 59, LDL 64, urine microalbumin /cr 156\par 9/18: A1c 8.6%, tot chol 150, trig 92, HDL 56, LDL 76\par 7/18: A1c 8.5% point of care\par 4/18: A1c 8.8%, Cr 1.43, tot chol 214, trig 113, HDL 60, \par 10/17: A1c 7.8%, TSH 1.67\par 6/17: A1c 8.5%, urine microalb/cr 18\par 2/17: tot chol 122, trig 68, HDL 62, LDL 46, Cr 1.30, eGFR 42\par 12/16: A1c 8.3%, tot chol 196, trig 91, HDL 54, \par 9/16: A1c 8.1%\par 7/16: A1c 8.5%,  urine microalb/cr 96, TSH 2.52\par \par bone density 2/17, Hologic: \par L1-L4  1.060 T -0.8 (L1 T -1.2)\par tot hip 1.014, T -0.1\par fem neck 0.972, T 0.1

## 2021-05-03 NOTE — HISTORY OF PRESENT ILLNESS
[FreeTextEntry1] : \par One touch ultra meter: 14d 179 .  testing 3-4x/day\par today 147.  Sugars usually 140-170 range,  Bedtime glucose (11p) varies depending on her eating (162-325). \par eating late with potato chips.  Changed from pork skins because of a toothache, needs to see dentist.\par no polyuria, polydipsia, SOB, chest pain, neuropathy symptoms.   Nocturia 2-3x/night.\par legs get tired when she walks.  Went walking yesterday.\par last ophtho 4/2021, mild-mod non proli DR, no progression\par .\par Meds: Tresiba 11 units/day\par Humalog 6-8 units for meals, 7 units for late night snack\par Trulicity 1.5mg/week, when she has it (takes on Wed)\par metformin 1g bid \par chlorthalidone 25mg\par atorvastatin 20mg\par vitamin D\par pantoprazole\par Previous meds: lisinopril (lip swelling), Victoza (too expensive), bupropion (nausea)

## 2021-07-14 ENCOUNTER — APPOINTMENT (OUTPATIENT)
Dept: NEPHROLOGY | Facility: CLINIC | Age: 76
End: 2021-07-14
Payer: MEDICARE

## 2021-07-14 VITALS
BODY MASS INDEX: 29.08 KG/M2 | DIASTOLIC BLOOD PRESSURE: 70 MMHG | SYSTOLIC BLOOD PRESSURE: 120 MMHG | HEART RATE: 82 BPM | WEIGHT: 159 LBS

## 2021-07-14 PROCEDURE — 99214 OFFICE O/P EST MOD 30 MIN: CPT

## 2021-07-14 PROCEDURE — 99072 ADDL SUPL MATRL&STAF TM PHE: CPT

## 2021-07-17 LAB
ALBUMIN SERPL ELPH-MCNC: 4.2 G/DL
ANION GAP SERPL CALC-SCNC: 10 MMOL/L
APPEARANCE: CLEAR
BACTERIA: NEGATIVE
BILIRUBIN URINE: NEGATIVE
BLOOD URINE: NEGATIVE
BUN SERPL-MCNC: 20 MG/DL
CALCIUM SERPL-MCNC: 10.2 MG/DL
CHLORIDE SERPL-SCNC: 102 MMOL/L
CO2 SERPL-SCNC: 29 MMOL/L
COLOR: NORMAL
CREAT SERPL-MCNC: 1.15 MG/DL
CREAT SPEC-SCNC: 49 MG/DL
CREAT/PROT UR: 0.2 RATIO
ESTIMATED AVERAGE GLUCOSE: 177 MG/DL
GLUCOSE QUALITATIVE U: NEGATIVE
GLUCOSE SERPL-MCNC: 170 MG/DL
HBA1C MFR BLD HPLC: 7.8 %
HYALINE CASTS: 3 /LPF
KETONES URINE: NEGATIVE
LEUKOCYTE ESTERASE URINE: NEGATIVE
MICROSCOPIC-UA: NORMAL
NITRITE URINE: NEGATIVE
PH URINE: 8
PHOSPHATE SERPL-MCNC: 3.3 MG/DL
POTASSIUM SERPL-SCNC: 4.6 MMOL/L
PROT UR-MCNC: 10 MG/DL
PROTEIN URINE: NORMAL
RED BLOOD CELLS URINE: 1 /HPF
SODIUM SERPL-SCNC: 141 MMOL/L
SPECIFIC GRAVITY URINE: 1.01
SQUAMOUS EPITHELIAL CELLS: 3 /HPF
UROBILINOGEN URINE: NORMAL
WHITE BLOOD CELLS URINE: 3 /HPF

## 2021-07-17 NOTE — ASSESSMENT
[FreeTextEntry1] : all lab data was reviewed with patient in detail from 7/14/2021\par  77 yo woman  with CKD 3, microalbuminuria, HTN, uncontrolled DMT2 overweight\par -HTN- BP at goal - c/w present regimen\par - CKD 3- creat stable 1.15; electrolytes WNL - c/w low protein diet, avoiding NSAIDs\par --microalbuminuria-- acceptable; \par NB: angioedema with lisinopril-  c/w same plan: avoid RASi - if develops worsening proteinuria- consider allergy eval to see if candidate for ARB\par - DMT2-: A1C town to 7.8%- right direction, but needs to get lower- discussed lifestyle modifications especially need to resume walking and reduction of portion size when eating\par -hyperlipidemia-  c/w Atorvastatin\par -overweight-  trending up again- emphasized need to lose weight-\par \par f/u OV 3-4 months

## 2021-07-17 NOTE — HISTORY OF PRESENT ILLNESS
[FreeTextEntry1] : 75 yo woman with CKD 3, microalbuminuria, HTN, DMT2, here for follow up evaluation.\par reports that ankles started to swell last week- No new events- but did eat out when started on about 7/10\par also reports that was recently started on carvedilol \par upon further questioning offers that she ran out of chlorthalidone about 7-10 days ago and restarted today\par weight going back up- not good\par No NSAIDs \par Denies flank pain, dysuria, hematuria or frothy urine \par No shortness of breath, no chest pain\par \par \par NB: Off CEI after allergic rxn spring 2018

## 2021-07-25 LAB — URINE CYTOLOGY: NORMAL

## 2021-09-01 ENCOUNTER — NON-APPOINTMENT (OUTPATIENT)
Age: 76
End: 2021-09-01

## 2021-09-01 ENCOUNTER — APPOINTMENT (OUTPATIENT)
Dept: ENDOCRINOLOGY | Facility: CLINIC | Age: 76
End: 2021-09-01
Payer: MEDICARE

## 2021-09-01 VITALS
DIASTOLIC BLOOD PRESSURE: 77 MMHG | BODY MASS INDEX: 29.63 KG/M2 | WEIGHT: 162 LBS | SYSTOLIC BLOOD PRESSURE: 128 MMHG | HEART RATE: 67 BPM

## 2021-09-01 PROCEDURE — 99214 OFFICE O/P EST MOD 30 MIN: CPT

## 2021-09-01 NOTE — PHYSICAL EXAM
[Alert] : alert [No Acute Distress] : no acute distress [No Proptosis] : no proptosis [No Lid Lag] : no lid lag [Normal Hearing] : hearing was normal [No LAD] : no lymphadenopathy [Thyroid Not Enlarged] : the thyroid was not enlarged [Clear to Auscultation] : lungs were clear to auscultation bilaterally [Normal S1, S2] : normal S1 and S2 [Regular Rhythm] : with a regular rhythm [No Edema] : no peripheral edema [Pedal Pulses Normal] : the pedal pulses are present [Foot Ulcers] : no foot ulcers [Normal Sensation on Monofilament Testing] : normal sensation on monofilament testing of lower extremities [Normal Affect] : the affect was normal [Normal Mood] : the mood was normal [de-identified] : no onychomycoses, mild to moderate acanthosis nigricans

## 2021-09-01 NOTE — DATA REVIEWED
[FreeTextEntry1] : 7/21: A1c 7.8%\par 3/21: uine microalbumin/cr 127\par 1/21: A1c 7.7%\par 8/20: A1c 7.3%, urine microalbumin/cr 111, 25D 47.9\par 6/20: A1c 7.5%, TSH 1.72, Covid Ab (+), tot chol 101, trig 142, HDL 59, LDL 63\par 11/19: A1c 8.5%, urine microalbumin/cr 218\par 2/20: A1C 8.3%\par 11/19: A1c 8.5%\par 7/19: A1c 8.5%, Cr 1.33, GFR 46\par 3/19: A1c 8.6%, tot chol 133, trig 87, HDL 59, LDL 57, TSH 2.65, Cr 1.33, urine microalbumin/cr 146\par 11/18: A1c 8.4%, tot chol 140, trig 85, HDL 59, LDL 64, urine microalbumin /cr 156\par 9/18: A1c 8.6%, tot chol 150, trig 92, HDL 56, LDL 76\par 7/18: A1c 8.5% point of care\par 4/18: A1c 8.8%, Cr 1.43, tot chol 214, trig 113, HDL 60, \par 10/17: A1c 7.8%, TSH 1.67\par 6/17: A1c 8.5%, urine microalb/cr 18\par 2/17: tot chol 122, trig 68, HDL 62, LDL 46, Cr 1.30, eGFR 42\par 12/16: A1c 8.3%, tot chol 196, trig 91, HDL 54, \par 9/16: A1c 8.1%\par 7/16: A1c 8.5%,  urine microalb/cr 96, TSH 2.52\par \par bone density 2/17, Hologic: \par L1-L4  1.060 T -0.8 (L1 T -1.2)\par tot hip 1.014, T -0.1\par fem neck 0.972, T 0.1

## 2021-09-01 NOTE — ASSESSMENT
[FreeTextEntry1] : Diabetes with multiple complications, microalbuminuria on ACEI.\par Uncontrolled  due to night eating.  She also probably eating more now without Trulicity on board.\par Recommended she find another outlet for her stress besides eating.\par Increase Tresiba to 13 units, increase Humalog to 10-12 units with meals.  Explained increased insulin requirements without having Trulicity.\par Trulicity samples given (low [id number], exp 1/18/2023, 4 weeks).  [employer] application given, maybe she can get Trulicity via patient assistance program.\par continue statin therapy\par Advised to ask cardiologist bout leg pain (? claudication symptom)\par RTO 3 months\par

## 2021-09-01 NOTE — HISTORY OF PRESENT ILLNESS
[FreeTextEntry1] : \par One touch ultra meter: 14d 230 (n32), 30d 240 (n76)  .  testing 3-4x/day\par sugar range .   mostly 200-300s, but many over 300 at night after eating.\par under stress, son moved in 2 months ago and there is not enough space\par eating more chips at night\par also is not able to afford Trulicity, she is in medicare gap\par no chest pain, SOB or neuropathy.  legs feel tight after walking 1 block\par last ophtho 4/2021, mild-mod non proli DR, no progression.  Has follow up later this month\par .\par Meds: Tresiba 11 units/day\par Humalog 6-8 units for meals, 7 units for late night snack\par Trulicity 1.5mg/week, when she has it (takes on Wed)\par metformin 1g bid \par chlorthalidone 25mg\par atorvastatin 20mg\par vitamin D\par pantoprazole\par Previous meds: lisinopril (lip swelling), Victoza (too expensive), bupropion (nausea)

## 2021-09-02 ENCOUNTER — RX RENEWAL (OUTPATIENT)
Age: 76
End: 2021-09-02

## 2021-09-08 ENCOUNTER — RX RENEWAL (OUTPATIENT)
Age: 76
End: 2021-09-08

## 2021-09-08 ENCOUNTER — APPOINTMENT (OUTPATIENT)
Dept: INTERNAL MEDICINE | Facility: CLINIC | Age: 76
End: 2021-09-08
Payer: MEDICARE

## 2021-09-08 VITALS
DIASTOLIC BLOOD PRESSURE: 80 MMHG | HEIGHT: 62 IN | HEART RATE: 94 BPM | BODY MASS INDEX: 29.81 KG/M2 | SYSTOLIC BLOOD PRESSURE: 132 MMHG | OXYGEN SATURATION: 98 % | TEMPERATURE: 98 F | WEIGHT: 162 LBS

## 2021-09-08 PROCEDURE — 99214 OFFICE O/P EST MOD 30 MIN: CPT

## 2021-09-09 DIAGNOSIS — R10.9 UNSPECIFIED ABDOMINAL PAIN: ICD-10-CM

## 2021-09-10 ENCOUNTER — NON-APPOINTMENT (OUTPATIENT)
Age: 76
End: 2021-09-10

## 2021-09-10 ENCOUNTER — APPOINTMENT (OUTPATIENT)
Dept: OPHTHALMOLOGY | Facility: CLINIC | Age: 76
End: 2021-09-10
Payer: MEDICARE

## 2021-09-10 PROCEDURE — 92012 INTRM OPH EXAM EST PATIENT: CPT

## 2021-09-10 PROCEDURE — 92133 CPTRZD OPH DX IMG PST SGM ON: CPT

## 2021-09-10 PROCEDURE — 92083 EXTENDED VISUAL FIELD XM: CPT

## 2021-09-17 ENCOUNTER — RX RENEWAL (OUTPATIENT)
Age: 76
End: 2021-09-17

## 2021-09-30 ENCOUNTER — RX RENEWAL (OUTPATIENT)
Age: 76
End: 2021-09-30

## 2021-11-18 ENCOUNTER — APPOINTMENT (OUTPATIENT)
Dept: NEPHROLOGY | Facility: CLINIC | Age: 76
End: 2021-11-18
Payer: MEDICARE

## 2021-11-18 VITALS — SYSTOLIC BLOOD PRESSURE: 138 MMHG | HEART RATE: 82 BPM | DIASTOLIC BLOOD PRESSURE: 75 MMHG | OXYGEN SATURATION: 100 %

## 2021-11-18 PROCEDURE — 99214 OFFICE O/P EST MOD 30 MIN: CPT

## 2021-11-18 NOTE — PHYSICAL EXAM
[General Appearance - Alert] : alert [General Appearance - In No Acute Distress] : in no acute distress [General Appearance - Well Nourished] : well nourished [General Appearance - Well Developed] : well developed [General Appearance - Well-Appearing] : healthy appearing [] : no respiratory distress [Respiration, Rhythm And Depth] : normal respiratory rhythm and effort [Exaggerated Use Of Accessory Muscles For Inspiration] : no accessory muscle use [Auscultation Breath Sounds / Voice Sounds] : lungs were clear to auscultation bilaterally [Heart Rate And Rhythm] : heart rate was normal and rhythm regular [Heart Sounds] : normal S1 and S2 [Heart Sounds Gallop] : no gallops [Murmurs] : no murmurs [Heart Sounds Pericardial Friction Rub] : no pericardial rub [Edema] : there was no peripheral edema [Oriented To Time, Place, And Person] : oriented to person, place, and time [Impaired Insight] : insight and judgment were intact

## 2021-11-20 NOTE — REASON FOR VISIT
[Follow-Up] : a follow-up visit [FreeTextEntry1] : for CKD 3 HTN, microalbuminuria (1) assistive equipment

## 2021-11-20 NOTE — ASSESSMENT
[FreeTextEntry1] : rediscussed labs from 7/2021- new labs ordered now\par  77 yo woman  with CKD 3, microalbuminuria, HTN, uncontrolled DMT2 overweight\par -HTN- BP controlled- c/w present regimen\par - CKD 3- clinically stable; c/w low protein diet, avoiding NSAIDs- for new data; consider [id number]\par --microalbuminuria-- acceptable; \par NB: angioedema with lisinopril-  c/w same plan: avoid RASi - if develops worsening proteinuria- consider allergy eval to see if candidate for ARB\par - DMT2-: last A1C  7.8%- above goal, but was improving- repeat data ordered\par -hyperlipidemia-  c/w Atorvastatin\par -overweight- emphasized need to lose weight-\par \par f/u OV 3-4 months

## 2021-11-20 NOTE — HISTORY OF PRESENT ILLNESS
[FreeTextEntry1] : 77 yo woman here for f/u evaluation of  CKD 3, microalbuminuria, HTN, DMT2.\par generally feels okay- compliant with meds\par not much exercise\par No NSAIDs \par Denies flank pain, dysuria, hematuria or frothy urine \par No shortness of breath, no chest pain\par \par \par NB: Off CEI after allergic rxn spring 2018

## 2021-11-30 ENCOUNTER — APPOINTMENT (OUTPATIENT)
Dept: VASCULAR SURGERY | Facility: CLINIC | Age: 76
End: 2021-11-30
Payer: MEDICARE

## 2021-11-30 VITALS
DIASTOLIC BLOOD PRESSURE: 81 MMHG | BODY MASS INDEX: 29.44 KG/M2 | SYSTOLIC BLOOD PRESSURE: 144 MMHG | HEIGHT: 62 IN | HEART RATE: 94 BPM | WEIGHT: 160 LBS

## 2021-11-30 PROCEDURE — 99203 OFFICE O/P NEW LOW 30 MIN: CPT

## 2021-11-30 PROCEDURE — 93923 UPR/LXTR ART STDY 3+ LVLS: CPT

## 2021-12-01 ENCOUNTER — NON-APPOINTMENT (OUTPATIENT)
Age: 76
End: 2021-12-01

## 2021-12-01 LAB
ALBUMIN SERPL ELPH-MCNC: 4.5 G/DL
ALP BLD-CCNC: 103 U/L
ALT SERPL-CCNC: 10 U/L
ANION GAP SERPL CALC-SCNC: 16 MMOL/L
APPEARANCE: CLEAR
AST SERPL-CCNC: 12 U/L
BACTERIA: NEGATIVE
BASOPHILS # BLD AUTO: 0.11 K/UL
BASOPHILS NFR BLD AUTO: 1.1 %
BILIRUB SERPL-MCNC: 0.3 MG/DL
BILIRUBIN URINE: NEGATIVE
BLOOD URINE: NEGATIVE
BUN SERPL-MCNC: 27 MG/DL
CALCIUM SERPL-MCNC: 10 MG/DL
CHLORIDE SERPL-SCNC: 103 MMOL/L
CHOLEST SERPL-MCNC: 146 MG/DL
CO2 SERPL-SCNC: 25 MMOL/L
COLOR: NORMAL
CREAT SERPL-MCNC: 1.2 MG/DL
CREAT SPEC-SCNC: 88 MG/DL
CYSTATIN C SERPL-MCNC: 1.28 MG/L
EOSINOPHIL # BLD AUTO: 0.22 K/UL
EOSINOPHIL NFR BLD AUTO: 2.3 %
ESTIMATED AVERAGE GLUCOSE: 206 MG/DL
GFR/BSA.PRED SERPLBLD CYS-BASED-ARV: 49 ML/MIN
GLUCOSE QUALITATIVE U: NEGATIVE
GLUCOSE SERPL-MCNC: 106 MG/DL
HBA1C MFR BLD HPLC: 8.8 %
HCT VFR BLD CALC: 41.9 %
HDLC SERPL-MCNC: 56 MG/DL
HGB BLD-MCNC: 13.7 G/DL
HYALINE CASTS: 4 /LPF
IMM GRANULOCYTES NFR BLD AUTO: 0.2 %
KETONES URINE: NEGATIVE
LDLC SERPL CALC-MCNC: 63 MG/DL
LEUKOCYTE ESTERASE URINE: NEGATIVE
LYMPHOCYTES # BLD AUTO: 4.46 K/UL
LYMPHOCYTES NFR BLD AUTO: 45.9 %
MAN DIFF?: NORMAL
MCHC RBC-ENTMCNC: 29 PG
MCHC RBC-ENTMCNC: 32.7 GM/DL
MCV RBC AUTO: 88.8 FL
MICROALBUMIN 24H UR DL<=1MG/L-MCNC: 13.4 MG/DL
MICROALBUMIN/CREAT 24H UR-RTO: 153 MG/G
MICROSCOPIC-UA: NORMAL
MONOCYTES # BLD AUTO: 0.65 K/UL
MONOCYTES NFR BLD AUTO: 6.7 %
NEUTROPHILS # BLD AUTO: 4.25 K/UL
NEUTROPHILS NFR BLD AUTO: 43.8 %
NITRITE URINE: NEGATIVE
NONHDLC SERPL-MCNC: 90 MG/DL
PH URINE: 6
PHOSPHATE SERPL-MCNC: 3.5 MG/DL
PLATELET # BLD AUTO: 337 K/UL
POTASSIUM SERPL-SCNC: 5.3 MMOL/L
PROT SERPL-MCNC: 7.3 G/DL
PROTEIN URINE: ABNORMAL
RBC # BLD: 4.72 M/UL
RBC # FLD: 13.1 %
RED BLOOD CELLS URINE: 1 /HPF
SODIUM SERPL-SCNC: 144 MMOL/L
SPECIFIC GRAVITY URINE: 1.02
SQUAMOUS EPITHELIAL CELLS: 10 /HPF
TRIGL SERPL-MCNC: 134 MG/DL
UROBILINOGEN URINE: NORMAL
WBC # FLD AUTO: 9.71 K/UL
WHITE BLOOD CELLS URINE: 9 /HPF

## 2021-12-01 NOTE — END OF VISIT
[Time Spent: ___ minutes] : I have spent [unfilled] minutes of time on the encounter. [FreeTextEntry3] : I, Dr. Mayco Heck  have read and attest that all the information, medical decision making and discharge instructions within are true and accurate. I personally performed the evaluation and management (E/M) services for this new patient.  That E/M includes conducting the initial examination, assessing all conditions, and establishing the plan of care.  Today, my resident, Caity Rajput, was here to observe my evaluation and management services for this patient to be followed going forward.\par

## 2021-12-01 NOTE — ASSESSMENT
[FreeTextEntry1] : Ms. Danelle Robb is a 76 year old woman with PMH of HTN, HLD, CKD3, T2DM, presenting for initial evaluation of 1 month history of RLE claudication. JIM performed in the office demonstrates diminished JIM of 0.71 on the R side as well as severely blunted ankle and metatarsal waveforms, consistent with arterial disease.\par \par Plan\par 1. Patient's presentation consistent with intermittent claudication and PAD. Discussed with patient at length regarding the management, which at this point includes conservative measures including BMT and exercise therapy. Patient understands and would like to proceed at this point with medical management prior to further consideration of any intervention/surgery. Patient understands and will follow up with us again in 1 month. \par

## 2021-12-01 NOTE — PHYSICAL EXAM
[Normal Thyroid] : the thyroid was normal [Normal Breath Sounds] : Normal breath sounds [Normal Heart Sounds] : normal heart sounds [No Rash or Lesion] : No rash or lesion [Alert] : alert [Calm] : calm [0] : right 0 [1+] : right 1+ [2+] : left 2+ [Ankle Swelling (On Exam)] : not present [Varicose Veins Of Lower Extremities] : not present [] : not present [Abdomen Masses] : No abdominal masses [de-identified] : Elderly woman, pleasant and conversational  [FreeTextEntry1] : Normal capillary refill. Normal skin turgor. No wounds or skin breakdown appreciated.

## 2021-12-01 NOTE — HISTORY OF PRESENT ILLNESS
[FreeTextEntry1] : Ms. Danelle Robb is a 76 year old woman with PMH of HTN, HLD, CKD3, T2DM presenting for initial evaluation after referral by her nephrologist Dr. Celaya for evaluation of right leg claudication. Patient reports that her symptoms first started approximately 1 month ago. She describes pain in her right thigh, calf, and feet when she walks more than 1 block. When this happens, she has to stop and rest and the pain goes away. She reports smoking in the past but quit approximately 30 years ago. She does not have any rest pain or wounds. This is her first evaluation and she has not tried medical therapy or exercise therapy in the past. Her symptoms are bothersome but not lifestyle limiting at this point.

## 2021-12-01 NOTE — PROCEDURE
[FreeTextEntry1] : JIM performed in the office demonstrates R JIM of 0.73 (L JIM 0.88). PVR demonstrates blunted waveforms in the low thigh and distally.

## 2021-12-02 ENCOUNTER — RX RENEWAL (OUTPATIENT)
Age: 76
End: 2021-12-02

## 2021-12-07 ENCOUNTER — APPOINTMENT (OUTPATIENT)
Dept: ENDOCRINOLOGY | Facility: CLINIC | Age: 76
End: 2021-12-07
Payer: MEDICARE

## 2021-12-07 VITALS
BODY MASS INDEX: 30.36 KG/M2 | HEART RATE: 102 BPM | WEIGHT: 166 LBS | SYSTOLIC BLOOD PRESSURE: 148 MMHG | DIASTOLIC BLOOD PRESSURE: 80 MMHG

## 2021-12-07 PROCEDURE — 99214 OFFICE O/P EST MOD 30 MIN: CPT

## 2021-12-07 NOTE — PHYSICAL EXAM
[Alert] : alert [No Acute Distress] : no acute distress [No Proptosis] : no proptosis [No Lid Lag] : no lid lag [Normal Hearing] : hearing was normal [No LAD] : no lymphadenopathy [Thyroid Not Enlarged] : the thyroid was not enlarged [Clear to Auscultation] : lungs were clear to auscultation bilaterally [Normal S1, S2] : normal S1 and S2 [Regular Rhythm] : with a regular rhythm [No Edema] : no peripheral edema [Pedal Pulses Normal] : the pedal pulses are present [Normal Sensation on Monofilament Testing] : normal sensation on monofilament testing of lower extremities [Normal Affect] : the affect was normal [Normal Mood] : the mood was normal [Foot Ulcers] : no foot ulcers [de-identified] : fingerstick 89, pre lunch [de-identified] : no onychomycoses, mild to moderate acanthosis nigricans

## 2021-12-07 NOTE — HISTORY OF PRESENT ILLNESS
[FreeTextEntry1] : Forgot her glucometer at home.\par Still eating at night, which she knows is her biggest problem.  She's not sure why she is eating -- probably due to boredom.\par Trulicity copay is expensive so not taking it every week.  When she doesn't take it, she noticed appetite is increased.\par no nocturia, chest pain, SOB or neuropathy\par has some shoulder pain\par saw vascular for claudication.  was recommended to walk more.\par up to date with ophtho\par labs reviewed from 11/21: A1c 8.8%\par plans to get flu vaccine tomorrow with PCP\par .\par Meds: Tresiba 11 units/day\par Humalog 6-8 units for meals, 7 units for late night snack\par Trulicity 1.5mg/week, when she has it (takes on Wed)\par metformin 1g bid \par chlorthalidone 25mg\par atorvastatin 20mg\par vitamin D\par pantoprazole\par Previous meds: lisinopril (lip swelling), Victoza (too expensive), bupropion (nausea)

## 2021-12-07 NOTE — ASSESSMENT
[FreeTextEntry1] : Diabetes with multiple complications, microalbuminuria on ACEI.\par Suboptimal control due to night eating.\par Advised to stop eating at night, and she acknowledges this is her problem.  Suggested eating dinner later (6-7pm) and then no eating after that. \par Trulicity sample given and also gave application for [facility] (pt assistance program).  Suggested changing to Ozempic, if cost is lower for her\par continue statin therapy\par \par RTO 4 months\par

## 2021-12-07 NOTE — DATA REVIEWED
[FreeTextEntry1] : 11/21: A1c 8.8%, tot chol 146, trig 134, HDL 56, LDL 63, urine microalbumin/cr 153\par 7/21: A1c 7.8%\par 3/21: uine microalbumin/cr 127\par 1/21: A1c 7.7%\par 8/20: A1c 7.3%, urine microalbumin/cr 111, 25D 47.9\par 6/20: A1c 7.5%, TSH 1.72, Covid Ab (+), tot chol 101, trig 142, HDL 59, LDL 63\par 11/19: A1c 8.5%, urine microalbumin/cr 218\par 2/20: A1C 8.3%\par 11/19: A1c 8.5%\par 7/19: A1c 8.5%, Cr 1.33, GFR 46\par 3/19: A1c 8.6%, tot chol 133, trig 87, HDL 59, LDL 57, TSH 2.65, Cr 1.33, urine microalbumin/cr 146\par 11/18: A1c 8.4%, tot chol 140, trig 85, HDL 59, LDL 64, urine microalbumin /cr 156\par 9/18: A1c 8.6%, tot chol 150, trig 92, HDL 56, LDL 76\par 7/18: A1c 8.5% point of care\par 4/18: A1c 8.8%, Cr 1.43, tot chol 214, trig 113, HDL 60, \par 10/17: A1c 7.8%, TSH 1.67\par 6/17: A1c 8.5%, urine microalb/cr 18\par 2/17: tot chol 122, trig 68, HDL 62, LDL 46, Cr 1.30, eGFR 42\par 12/16: A1c 8.3%, tot chol 196, trig 91, HDL 54, \par 9/16: A1c 8.1%\par 7/16: A1c 8.5%,  urine microalb/cr 96, TSH 2.52\par \par bone density 2/17, Hologic: \par L1-L4  1.060 T -0.8 (L1 T -1.2)\par tot hip 1.014, T -0.1\par fem neck 0.972, T 0.1

## 2021-12-08 ENCOUNTER — APPOINTMENT (OUTPATIENT)
Dept: INTERNAL MEDICINE | Facility: CLINIC | Age: 76
End: 2021-12-08
Payer: MEDICARE

## 2021-12-08 VITALS
HEIGHT: 62 IN | HEART RATE: 94 BPM | SYSTOLIC BLOOD PRESSURE: 134 MMHG | WEIGHT: 165 LBS | BODY MASS INDEX: 30.36 KG/M2 | OXYGEN SATURATION: 98 % | DIASTOLIC BLOOD PRESSURE: 83 MMHG | TEMPERATURE: 97.2 F

## 2021-12-08 PROCEDURE — 93000 ELECTROCARDIOGRAM COMPLETE: CPT

## 2021-12-08 PROCEDURE — 99397 PER PM REEVAL EST PAT 65+ YR: CPT | Mod: 25

## 2022-01-20 ENCOUNTER — NON-APPOINTMENT (OUTPATIENT)
Age: 77
End: 2022-01-20

## 2022-01-20 ENCOUNTER — APPOINTMENT (OUTPATIENT)
Dept: OPHTHALMOLOGY | Facility: CLINIC | Age: 77
End: 2022-01-20
Payer: MEDICARE

## 2022-01-20 PROCEDURE — 92012 INTRM OPH EXAM EST PATIENT: CPT

## 2022-01-20 PROCEDURE — 92083 EXTENDED VISUAL FIELD XM: CPT

## 2022-01-21 RX ORDER — DULAGLUTIDE 1.5 MG/.5ML
1.5 INJECTION, SOLUTION SUBCUTANEOUS
Qty: 2 | Refills: 5 | Status: DISCONTINUED | COMMUNITY
Start: 2019-11-13 | End: 2022-01-21

## 2022-01-25 ENCOUNTER — APPOINTMENT (OUTPATIENT)
Dept: OPHTHALMOLOGY | Facility: CLINIC | Age: 77
End: 2022-01-25
Payer: MEDICARE

## 2022-01-25 ENCOUNTER — NON-APPOINTMENT (OUTPATIENT)
Age: 77
End: 2022-01-25

## 2022-01-25 PROCEDURE — 92250 FUNDUS PHOTOGRAPHY W/I&R: CPT

## 2022-01-25 PROCEDURE — 92014 COMPRE OPH EXAM EST PT 1/>: CPT

## 2022-03-01 ENCOUNTER — APPOINTMENT (OUTPATIENT)
Dept: VASCULAR SURGERY | Facility: CLINIC | Age: 77
End: 2022-03-01
Payer: MEDICARE

## 2022-03-01 VITALS
WEIGHT: 185 LBS | DIASTOLIC BLOOD PRESSURE: 82 MMHG | HEIGHT: 62 IN | HEART RATE: 90 BPM | SYSTOLIC BLOOD PRESSURE: 157 MMHG | BODY MASS INDEX: 34.04 KG/M2

## 2022-03-01 PROCEDURE — 99213 OFFICE O/P EST LOW 20 MIN: CPT

## 2022-03-03 NOTE — ASSESSMENT
[Arterial/Venous Disease] : arterial/venous disease [FreeTextEntry1] : 76 year old woman with PMH of HTN, HLD, CKD3, T2DM returns for a f/u on her right leg claudication. JIM performed in the office on 11/30/21 that  demonstrated diminished JIM of 0.71 on the R side as well as severely blunted ankle and metatarsal waveforms, consistent with arterial disease. Patient was recommended exercise therapy.\par Improved claudication, she is able to walk 5-6 blocks without need to stop.\par She was recommended to continue walking and exercising.\par She will f/u in 6 months or sooner if new symptoms.\par

## 2022-03-03 NOTE — PHYSICAL EXAM
[Normal Thyroid] : the thyroid was normal [Normal Breath Sounds] : Normal breath sounds [Normal Heart Sounds] : normal heart sounds [0] : right 0 [1+] : right 1+ [2+] : left 2+ [No Rash or Lesion] : No rash or lesion [Alert] : alert [Calm] : calm [Ankle Swelling (On Exam)] : not present [Varicose Veins Of Lower Extremities] : not present [] : not present [Abdomen Masses] : No abdominal masses [de-identified] : Elderly woman, pleasant and conversational  [FreeTextEntry1] : Normal capillary refill. Normal skin turgor. No wounds or skin breakdown appreciated.

## 2022-03-03 NOTE — HISTORY OF PRESENT ILLNESS
[FreeTextEntry1] : 76 year old woman with PMH of HTN, HLD, CKD3, T2DM returns for a f/u on her right leg claudication. JIM performed in the office on 11/30/21 that  demonstrated diminished JIM of 0.71 on the R side as well as severely blunted ankle and metatarsal waveforms, consistent with arterial disease. Patient was recommended exercise therapy. She states that before she was could only walk 1-2 blocks and after she started exercise regimen she is able to walk now 5-6 blocks. She feels that she is definitely improved. Denies rest pain, skin breakdown.\par

## 2022-03-03 NOTE — ADDENDUM
[FreeTextEntry1] : I, Dr. Mayco Heck, personally performed the evaluation and management (E/M) services for this established patient who presents today with (an) existing condition(s).  That E/M includes conducting the examination, assessing all conditions, and (re)establishing/reinforcing a plan of care.  Today, my ACP, Dariela ESCALERA, was here to observe my evaluation and management services for this condition to be followed going forward.\par \par \par

## 2022-03-25 ENCOUNTER — APPOINTMENT (OUTPATIENT)
Dept: ENDOCRINOLOGY | Facility: CLINIC | Age: 77
End: 2022-03-25
Payer: MEDICARE

## 2022-03-25 VITALS
WEIGHT: 164 LBS | SYSTOLIC BLOOD PRESSURE: 156 MMHG | DIASTOLIC BLOOD PRESSURE: 80 MMHG | BODY MASS INDEX: 30 KG/M2 | HEART RATE: 97 BPM

## 2022-03-25 LAB — HBA1C MFR BLD HPLC: 8.7

## 2022-03-25 PROCEDURE — 99214 OFFICE O/P EST MOD 30 MIN: CPT | Mod: 25

## 2022-03-25 PROCEDURE — 83036 HEMOGLOBIN GLYCOSYLATED A1C: CPT | Mod: QW

## 2022-03-25 NOTE — HISTORY OF PRESENT ILLNESS
[FreeTextEntry1] : today glucose was 188 in am.  had pork rinds at night (snack) \par She got Ozempic (which was expensive) but isn't sure she is taking the right dose. she showed me the pen and she was only injecting the primer dose the past two weeks.  \par saw vascular for claudication.  was recommended to walk more.\par having palpitations the past week.\par up to date with ophtho\par \par Meds: Tresiba 11 units/day\par Humalog 6-8 units for meals, 7 units for late night snack\par Ozempic 1mg/week\par metformin 1g bid \par chlorthalidone 25mg\par atorvastatin 20mg\par vitamin D\par pantoprazole\par Previous meds: lisinopril (lip swelling), Victoza (too expensive), bupropion (nausea), Trulicity (changed to Ozempic)

## 2022-03-25 NOTE — DATA REVIEWED
[FreeTextEntry1] : 3/22: A1c 8.7% POC\par 11/21: A1c 8.8%, tot chol 146, trig 134, HDL 56, LDL 63, urine microalbumin/cr 153\par 7/21: A1c 7.8%\par 3/21: uine microalbumin/cr 127\par 1/21: A1c 7.7%\par 8/20: A1c 7.3%, urine microalbumin/cr 111, 25D 47.9\par 6/20: A1c 7.5%, TSH 1.72, Covid Ab (+), tot chol 101, trig 142, HDL 59, LDL 63\par 11/19: A1c 8.5%, urine microalbumin/cr 218\par 2/20: A1C 8.3%\par 11/19: A1c 8.5%\par 7/19: A1c 8.5%, Cr 1.33, GFR 46\par 3/19: A1c 8.6%, tot chol 133, trig 87, HDL 59, LDL 57, TSH 2.65, Cr 1.33, urine microalbumin/cr 146\par 11/18: A1c 8.4%, tot chol 140, trig 85, HDL 59, LDL 64, urine microalbumin /cr 156\par 9/18: A1c 8.6%, tot chol 150, trig 92, HDL 56, LDL 76\par 7/18: A1c 8.5% point of care\par 4/18: A1c 8.8%, Cr 1.43, tot chol 214, trig 113, HDL 60, \par 10/17: A1c 7.8%, TSH 1.67\par 6/17: A1c 8.5%, urine microalb/cr 18\par 2/17: tot chol 122, trig 68, HDL 62, LDL 46, Cr 1.30, eGFR 42\par 12/16: A1c 8.3%, tot chol 196, trig 91, HDL 54, \par 9/16: A1c 8.1%\par 7/16: A1c 8.5%,  urine microalb/cr 96, TSH 2.52\par \par bone density 2/17, Hologic: \par L1-L4  1.060 T -0.8 (L1 T -1.2)\par tot hip 1.014, T -0.1\par fem neck 0.972, T 0.1

## 2022-03-25 NOTE — ASSESSMENT
[FreeTextEntry1] : Diabetes with multiple complications, microalbuminuria on ACEI.\par Suboptimal control due to night eating.\par Advised to stop eating at night, and she acknowledges this is her problem.  Suggested setting aside two days a week that she will not eat after dinner, and then can try to increase more days a week.\par Ozempic dose given in the office; she has been taking only a tiny fraction of the full dose, so was not getting much effect of it.  Hopefully, the full dose will suppress her appetite and help her be compliant with not eating at night.  [facility] patient assistance program application given to reduce cost.\par Continue basal/bolus insulin and metformin.\par Recommended walking 20 min/day.\par continue statin therapy\par RTO 4 months\par

## 2022-03-25 NOTE — PHYSICAL EXAM
[Alert] : alert [No Acute Distress] : no acute distress [No Proptosis] : no proptosis [No Lid Lag] : no lid lag [Normal Hearing] : hearing was normal [No LAD] : no lymphadenopathy [Thyroid Not Enlarged] : the thyroid was not enlarged [Clear to Auscultation] : lungs were clear to auscultation bilaterally [Normal S1, S2] : normal S1 and S2 [Regular Rhythm] : with a regular rhythm [No Edema] : no peripheral edema [Foot Ulcers] : no foot ulcers [Pedal Pulses Normal] : the pedal pulses are present [Normal Sensation on Monofilament Testing] : normal sensation on monofilament testing of lower extremities [Normal Affect] : the affect was normal [Normal Mood] : the mood was normal [de-identified] : fingerstick 189, 3h pp breakfast (eli gomez, CC) [de-identified] : no onychomycoses, mild to moderate acanthosis nigricans

## 2022-03-30 ENCOUNTER — APPOINTMENT (OUTPATIENT)
Dept: NEPHROLOGY | Facility: CLINIC | Age: 77
End: 2022-03-30
Payer: MEDICARE

## 2022-03-30 VITALS
BODY MASS INDEX: 30 KG/M2 | OXYGEN SATURATION: 98 % | HEART RATE: 96 BPM | WEIGHT: 164 LBS | SYSTOLIC BLOOD PRESSURE: 143 MMHG | DIASTOLIC BLOOD PRESSURE: 80 MMHG

## 2022-03-30 PROCEDURE — 99214 OFFICE O/P EST MOD 30 MIN: CPT

## 2022-03-30 NOTE — PHYSICAL EXAM
[General Appearance - Alert] : alert [General Appearance - In No Acute Distress] : in no acute distress [General Appearance - Well Nourished] : well nourished [General Appearance - Well Developed] : well developed [General Appearance - Well-Appearing] : healthy appearing [] : no respiratory distress [Respiration, Rhythm And Depth] : normal respiratory rhythm and effort [Exaggerated Use Of Accessory Muscles For Inspiration] : no accessory muscle use [Auscultation Breath Sounds / Voice Sounds] : lungs were clear to auscultation bilaterally [Heart Rate And Rhythm] : heart rate was normal and rhythm regular [Heart Sounds] : normal S1 and S2 [Heart Sounds Gallop] : no gallops [Murmurs] : no murmurs [Edema] : there was no peripheral edema [Heart Sounds Pericardial Friction Rub] : no pericardial rub [Oriented To Time, Place, And Person] : oriented to person, place, and time [Impaired Insight] : insight and judgment were intact [No CVA Tenderness] : no ~M costovertebral angle tenderness

## 2022-03-31 LAB
25(OH)D3 SERPL-MCNC: 47.1 NG/ML
ALBUMIN SERPL ELPH-MCNC: 4.6 G/DL
ANION GAP SERPL CALC-SCNC: 13 MMOL/L
APPEARANCE: CLEAR
BACTERIA: ABNORMAL
BASOPHILS # BLD AUTO: 0.09 K/UL
BASOPHILS NFR BLD AUTO: 1.1 %
BILIRUBIN URINE: NEGATIVE
BLOOD URINE: NEGATIVE
BUN SERPL-MCNC: 24 MG/DL
CALCIUM SERPL-MCNC: 10.5 MG/DL
CALCIUM SERPL-MCNC: 10.5 MG/DL
CHLORIDE SERPL-SCNC: 99 MMOL/L
CO2 SERPL-SCNC: 28 MMOL/L
COLOR: NORMAL
CREAT SERPL-MCNC: 1.2 MG/DL
CREAT SPEC-SCNC: 115 MG/DL
CYSTATIN C SERPL-MCNC: 1.17 MG/L
EGFR: 48 ML/MIN/1.73M2
EOSINOPHIL # BLD AUTO: 0.15 K/UL
EOSINOPHIL NFR BLD AUTO: 1.8 %
ESTIMATED AVERAGE GLUCOSE: 212 MG/DL
GFR/BSA.PRED SERPLBLD CYS-BASED-ARV: 56 ML/MIN/1.73M2
GLUCOSE QUALITATIVE U: NEGATIVE
GLUCOSE SERPL-MCNC: 135 MG/DL
HBA1C MFR BLD HPLC: 9 %
HCT VFR BLD CALC: 44.4 %
HGB BLD-MCNC: 14.1 G/DL
HYALINE CASTS: 1 /LPF
IMM GRANULOCYTES NFR BLD AUTO: 0.1 %
KETONES URINE: NEGATIVE
LEUKOCYTE ESTERASE URINE: NEGATIVE
LYMPHOCYTES # BLD AUTO: 3.44 K/UL
LYMPHOCYTES NFR BLD AUTO: 40.6 %
MAN DIFF?: NORMAL
MCHC RBC-ENTMCNC: 28.1 PG
MCHC RBC-ENTMCNC: 31.8 GM/DL
MCV RBC AUTO: 88.4 FL
MICROALBUMIN 24H UR DL<=1MG/L-MCNC: 38.7 MG/DL
MICROALBUMIN/CREAT 24H UR-RTO: 335 MG/G
MICROSCOPIC-UA: NORMAL
MONOCYTES # BLD AUTO: 0.58 K/UL
MONOCYTES NFR BLD AUTO: 6.8 %
NEUTROPHILS # BLD AUTO: 4.2 K/UL
NEUTROPHILS NFR BLD AUTO: 49.6 %
NITRITE URINE: NEGATIVE
PARATHYROID HORMONE INTACT: 34 PG/ML
PH URINE: 7.5
PHOSPHATE SERPL-MCNC: 3 MG/DL
PLATELET # BLD AUTO: 347 K/UL
POTASSIUM SERPL-SCNC: 5 MMOL/L
PROTEIN URINE: ABNORMAL
RBC # BLD: 5.02 M/UL
RBC # FLD: 13.6 %
RED BLOOD CELLS URINE: 2 /HPF
SODIUM SERPL-SCNC: 141 MMOL/L
SPECIFIC GRAVITY URINE: 1.02
SQUAMOUS EPITHELIAL CELLS: 8 /HPF
UROBILINOGEN URINE: NORMAL
WBC # FLD AUTO: 8.47 K/UL
WHITE BLOOD CELLS URINE: 7 /HPF

## 2022-03-31 NOTE — ASSESSMENT
[FreeTextEntry1] : all lab data was reviewed with patient in detail from 3/30/2022\par  77 yo woman  with CKD 3, microalbuminuria, HTN, uncontrolled DMT2 overweight\par -HTN- BP a bit above goal- prefer <= 130/80- counseled on need to resume walking, reduce weight and limit salt intake\par -rapid HR- in the 90s- regular- declines change of med today- offered increase carvedilol to 6.25 mg BID- she will keep me apprised\par - CKD 3- clinically stable; c/w low protein diet, avoiding NSAIDs- consider sglt2i\par --microalbuminuria--  335, up a bit- improve BP and A1c control; consider sglt2i -declines new meds today- to re discuss next OV\par NB: angioedema with lisinopril-  c/w same plan: avoid RASi - if develops worsening proteinuria\par - DMT2-:  A1C up to 9%- counseled- needs close endo follow up- antihyperglycemics just changed as above; focus on exercise weight loss, reduced CHO intake\par -hyperlipidemia-  c/w Atorvastatin\par -overweight- emphasized need to lose weight-\par \par f/u OV 4-6 months \par will be seeing her other MDs in interim

## 2022-03-31 NOTE — HISTORY OF PRESENT ILLNESS
[FreeTextEntry1] : 77 yo woman with CKD 3, microalbuminuria, HTN, DMT2, here for follow evaluation\par seeing Dr. Heck for her right leg claudication- directed to walk more. No plans for surgical intervention at this time.\par Now on ozempic from trulicity- a1c was 8.7%- \par aware of her need to gt back  to a1c 7% and that she will need to walk/exercise more and reduce CHO intake\par reduce famotidine to 20 mg tablet as needed- using about 1-2 monthly\par No NSAIDs \par Denies flank pain, dysuria, hematuria or frothy urine \par No shortness of breath, no chest pain\par does say that she feels her heart beat fast at times\par \par \par NB: Off CEI after allergic rxn spring 2018

## 2022-04-11 PROBLEM — Z11.59 SCREENING FOR VIRAL DISEASE: Status: ACTIVE | Noted: 2020-06-10

## 2022-04-27 ENCOUNTER — RX RENEWAL (OUTPATIENT)
Age: 77
End: 2022-04-27

## 2022-05-19 ENCOUNTER — APPOINTMENT (OUTPATIENT)
Dept: OPHTHALMOLOGY | Facility: CLINIC | Age: 77
End: 2022-05-19
Payer: MEDICARE

## 2022-05-19 ENCOUNTER — NON-APPOINTMENT (OUTPATIENT)
Age: 77
End: 2022-05-19

## 2022-05-19 PROCEDURE — 92012 INTRM OPH EXAM EST PATIENT: CPT

## 2022-05-19 PROCEDURE — 92083 EXTENDED VISUAL FIELD XM: CPT

## 2022-06-08 ENCOUNTER — APPOINTMENT (OUTPATIENT)
Dept: INTERNAL MEDICINE | Facility: CLINIC | Age: 77
End: 2022-06-08
Payer: MEDICARE

## 2022-06-08 VITALS
TEMPERATURE: 97.3 F | SYSTOLIC BLOOD PRESSURE: 153 MMHG | BODY MASS INDEX: 29.44 KG/M2 | HEIGHT: 62 IN | DIASTOLIC BLOOD PRESSURE: 87 MMHG | HEART RATE: 96 BPM | OXYGEN SATURATION: 97 % | WEIGHT: 160 LBS

## 2022-06-08 DIAGNOSIS — E11.319 TYPE 2 DIABETES MELLITUS WITH UNSPECIFIED DIABETIC RETINOPATHY W/OUT MACULAR EDEMA: ICD-10-CM

## 2022-06-08 DIAGNOSIS — E11.3313 TYPE 2 DIABETES MELLITUS WITH MODERATE NONPROLIFERATIVE DIABETIC RETINOPATHY WITH MACULAR EDEMA, BILATERAL: ICD-10-CM

## 2022-06-08 DIAGNOSIS — I47.1 SUPRAVENTRICULAR TACHYCARDIA: ICD-10-CM

## 2022-06-08 PROCEDURE — 99214 OFFICE O/P EST MOD 30 MIN: CPT

## 2022-07-17 ENCOUNTER — RX RENEWAL (OUTPATIENT)
Age: 77
End: 2022-07-17

## 2022-07-21 ENCOUNTER — NON-APPOINTMENT (OUTPATIENT)
Age: 77
End: 2022-07-21

## 2022-07-21 ENCOUNTER — APPOINTMENT (OUTPATIENT)
Dept: OPHTHALMOLOGY | Facility: CLINIC | Age: 77
End: 2022-07-21

## 2022-07-21 PROCEDURE — 92012 INTRM OPH EXAM EST PATIENT: CPT

## 2022-07-26 ENCOUNTER — APPOINTMENT (OUTPATIENT)
Dept: ENDOCRINOLOGY | Facility: CLINIC | Age: 77
End: 2022-07-26

## 2022-07-26 VITALS
HEART RATE: 97 BPM | BODY MASS INDEX: 28.9 KG/M2 | DIASTOLIC BLOOD PRESSURE: 84 MMHG | WEIGHT: 158 LBS | SYSTOLIC BLOOD PRESSURE: 157 MMHG

## 2022-07-26 LAB
GLUCOSE BLDC GLUCOMTR-MCNC: 198
HBA1C MFR BLD HPLC: 7.4

## 2022-07-26 PROCEDURE — 82962 GLUCOSE BLOOD TEST: CPT

## 2022-07-26 PROCEDURE — 83036 HEMOGLOBIN GLYCOSYLATED A1C: CPT | Mod: QW

## 2022-07-26 PROCEDURE — 99214 OFFICE O/P EST MOD 30 MIN: CPT | Mod: 25

## 2022-07-26 RX ORDER — DORZOLAMIDE HYDROCHLORIDE TIMOLOL MALEATE 20; 5 MG/ML; MG/ML
22.3-6.8 SOLUTION/ DROPS OPHTHALMIC
Qty: 10 | Refills: 0 | Status: ACTIVE | COMMUNITY
Start: 2022-05-19

## 2022-07-26 NOTE — DATA REVIEWED
[FreeTextEntry1] : 7/22  A1c 7.4%, POC\par 3/22: A1c 8.7% POC, 9.0% urine alb/cr 335, 25D 47.1, Ca 10.5, PTH 34\par 11/21: A1c 8.8%, tot chol 146, trig 134, HDL 56, LDL 63, urine microalbumin/cr 153\par 7/21: A1c 7.8%\par 3/21: uine microalbumin/cr 127\par 1/21: A1c 7.7%\par 8/20: A1c 7.3%, urine microalbumin/cr 111, 25D 47.9\par 6/20: A1c 7.5%, TSH 1.72, Covid Ab (+), tot chol 101, trig 142, HDL 59, LDL 63\par 11/19: A1c 8.5%, urine microalbumin/cr 218\par 2/20: A1C 8.3%\par 11/19: A1c 8.5%\par 7/19: A1c 8.5%, Cr 1.33, GFR 46\par 3/19: A1c 8.6%, tot chol 133, trig 87, HDL 59, LDL 57, TSH 2.65, Cr 1.33, urine microalbumin/cr 146\par 11/18: A1c 8.4%, tot chol 140, trig 85, HDL 59, LDL 64, urine microalbumin /cr 156\par 9/18: A1c 8.6%, tot chol 150, trig 92, HDL 56, LDL 76\par 7/18: A1c 8.5% point of care\par 4/18: A1c 8.8%, Cr 1.43, tot chol 214, trig 113, HDL 60, \par 10/17: A1c 7.8%, TSH 1.67\par 6/17: A1c 8.5%, urine microalb/cr 18\par 2/17: tot chol 122, trig 68, HDL 62, LDL 46, Cr 1.30, eGFR 42\par 12/16: A1c 8.3%, tot chol 196, trig 91, HDL 54, \par 9/16: A1c 8.1%\par 7/16: A1c 8.5%,  urine microalb/cr 96, TSH 2.52\par \par bone density 2/17, Hologic: \par L1-L4  1.060 T -0.8 (L1 T -1.2)\par tot hip 1.014, T -0.1\par fem neck 0.972, T 0.1

## 2022-07-26 NOTE — PHYSICAL EXAM
[Alert] : alert [No Acute Distress] : no acute distress [No Proptosis] : no proptosis [No Lid Lag] : no lid lag [Normal Hearing] : hearing was normal [No LAD] : no lymphadenopathy [Thyroid Not Enlarged] : the thyroid was not enlarged [Clear to Auscultation] : lungs were clear to auscultation bilaterally [Normal S1, S2] : normal S1 and S2 [Regular Rhythm] : with a regular rhythm [No Edema] : no peripheral edema [Pedal Pulses Normal] : the pedal pulses are present [Normal Sensation on Monofilament Testing] : normal sensation on monofilament testing of lower extremities [Normal Affect] : the affect was normal [Normal Mood] : the mood was normal [Foot Ulcers] : no foot ulcers [de-identified] : fingerstick 198 ,  had orange juice.  looks younger than age [de-identified] : no onychomycoses, mild to moderate acanthosis nigricans

## 2022-07-26 NOTE — ASSESSMENT
[FreeTextEntry1] : Diabetes with multiple complications, microalbuminuria on ACEI.  BMI now < 30.\par Sugars significantly improved due to cessation of night eating.\par Titrate Ozempic to 2mg/week dose and she can/should reduce Humalog dose, if she is eating less (reduce by 2 units).   Continue Tresiba 11 units/day, samples given (#3 pens)\par Advised to avoid all sugared drinks (including juice) unless treating hypoglycemia \par continue statin therapy\par Pt assistance application given ([employer]) for Ozempic\par \par RTO 4 months\par

## 2022-07-26 NOTE — HISTORY OF PRESENT ILLNESS
[FreeTextEntry1] : forgot her glucometer\par She reports morning sugars in the 150-160s range.  She stopped snacking at night but this morning she had orange juice, which she usually doesn’t drink\par Weight is 6 lb less than last visit\par no polyuria, polydipsia, SOB, chest pain, or neuropathy symptoms \par walking every day, for her shopping\par last ophtho 5/22: mild-mod non proliferative DR\par \par Meds: Tresiba 11 units/day\par Humalog 8-10  units for meals, eating two meals/day\par Ozempic 1mg/week\par metformin 1g bid \par chlorthalidone 25mg\par atorvastatin 20mg\par vitamin D\par pantoprazole\par Previous meds: lisinopril (lip swelling), Victoza (too expensive), bupropion (nausea), Trulicity (changed to Ozempic)

## 2022-08-09 ENCOUNTER — APPOINTMENT (OUTPATIENT)
Dept: GASTROENTEROLOGY | Facility: CLINIC | Age: 77
End: 2022-08-09

## 2022-08-09 VITALS
BODY MASS INDEX: 29.26 KG/M2 | HEIGHT: 62 IN | TEMPERATURE: 96.9 F | HEART RATE: 106 BPM | SYSTOLIC BLOOD PRESSURE: 115 MMHG | RESPIRATION RATE: 16 BRPM | WEIGHT: 159 LBS | OXYGEN SATURATION: 97 % | DIASTOLIC BLOOD PRESSURE: 80 MMHG

## 2022-08-09 DIAGNOSIS — Z12.11 ENCOUNTER FOR SCREENING FOR MALIGNANT NEOPLASM OF COLON: ICD-10-CM

## 2022-08-09 DIAGNOSIS — R15.9 FULL INCONTINENCE OF FECES: ICD-10-CM

## 2022-08-09 PROCEDURE — 99213 OFFICE O/P EST LOW 20 MIN: CPT | Mod: 25

## 2022-08-09 PROCEDURE — 36415 COLL VENOUS BLD VENIPUNCTURE: CPT

## 2022-08-10 PROBLEM — Z12.11 COLON CANCER SCREENING: Status: ACTIVE | Noted: 2017-06-05

## 2022-08-10 PROBLEM — R15.9 FECAL INCONTINENCE: Status: ACTIVE | Noted: 2022-08-10

## 2022-08-10 LAB
ALBUMIN SERPL ELPH-MCNC: 4.4 G/DL
ALP BLD-CCNC: 90 U/L
ALT SERPL-CCNC: 13 U/L
ANION GAP SERPL CALC-SCNC: 12 MMOL/L
AST SERPL-CCNC: 15 U/L
BASOPHILS # BLD AUTO: 0.1 K/UL
BASOPHILS NFR BLD AUTO: 1.2 %
BILIRUB SERPL-MCNC: 0.3 MG/DL
BUN SERPL-MCNC: 24 MG/DL
CALCIUM SERPL-MCNC: 10 MG/DL
CHLORIDE SERPL-SCNC: 104 MMOL/L
CO2 SERPL-SCNC: 29 MMOL/L
CREAT SERPL-MCNC: 1.17 MG/DL
EGFR: 49 ML/MIN/1.73M2
EOSINOPHIL # BLD AUTO: 0.27 K/UL
EOSINOPHIL NFR BLD AUTO: 3.2 %
GLUCOSE SERPL-MCNC: 142 MG/DL
HCT VFR BLD CALC: 42.2 %
HGB BLD-MCNC: 14 G/DL
IMM GRANULOCYTES NFR BLD AUTO: 0.2 %
LYMPHOCYTES # BLD AUTO: 3.24 K/UL
LYMPHOCYTES NFR BLD AUTO: 37.9 %
MAN DIFF?: NORMAL
MCHC RBC-ENTMCNC: 29.5 PG
MCHC RBC-ENTMCNC: 33.2 GM/DL
MCV RBC AUTO: 88.8 FL
MONOCYTES # BLD AUTO: 0.58 K/UL
MONOCYTES NFR BLD AUTO: 6.8 %
NEUTROPHILS # BLD AUTO: 4.35 K/UL
NEUTROPHILS NFR BLD AUTO: 50.7 %
PLATELET # BLD AUTO: 358 K/UL
POTASSIUM SERPL-SCNC: 4.1 MMOL/L
PROT SERPL-MCNC: 7.1 G/DL
RBC # BLD: 4.75 M/UL
RBC # FLD: 14.3 %
SODIUM SERPL-SCNC: 145 MMOL/L
WBC # FLD AUTO: 8.56 K/UL

## 2022-08-25 NOTE — REASON FOR VISIT
[Follow-Up: _____] : a [unfilled] follow-up visit [FreeTextEntry1] : colon cancer screening, fecal incontinence

## 2022-08-25 NOTE — HISTORY OF PRESENT ILLNESS
[de-identified] : 76 yo female with hx GERD,  DM2, HTN, CKD, here for colonoscopy for hx of tubular adenoma in 2017; referred by Dr. Rivers. \par \par Reports she is generally feeling well. Reports heartburn symptoms are on occasion and takes famotidine prn. + thick clear phlegm. No red flag sx.  No odynophagia or dysphagia.  Not following with dietician. Denies fever, chill, cp, sob, abd pain, nausea, vomiting, diarrhea, constipation, hematochezia, flatulence, belching, unintentional wt loss or loss of appetite\par \par Pt reports fecal incontinence x 1 year. Notes liquid stool in the rectum that occurs approx twice day.  Otherwise has BM BID formed, no blood, no mucus.  Admits to complete evacuation. \par \par She is compliant with her medications including ozempic, metformin and insulin.  \par \par C-scope 2017: ascending colon 3-5 mm polyp, probable lipoma in ascending colon. PATH: TA

## 2022-08-25 NOTE — ASSESSMENT
[FreeTextEntry1] : 78 yo female with hx GERD,  DM2, HTN, CKD, here for colonoscopy for hx of tubular adenoma in 2017 with reccommendation to repeat in 2022; referred by Dr. Rivers. \par \par Hx tubular adenoma in 2017\par - Schedule colonoscopy; given age of 77, may consider no further colonoscopic evaluations pending results \par - Obtain pre-op labs (CBC + COMP)\par - Miralax Bowel prep provided\par - r/b/a/i discussed and patient agreeable\par -Pt was advised that an escort is needed to  from procedure\par -Will f/u post procedure\par \par Fecal incontinence\par - Discussed with patients there are numerous factors that may cause fecal incontinence including; constipation, diarrhea, lack of fiber, nerve damage, muscle weakness, rectal prolapse\par - Will assess the need for ARM testing once c-scope is completed\par - Advised pt to wear pads if symptoms worsen\par \par F/U post-cscope \par

## 2022-09-07 NOTE — PHYSICAL EXAM
[Normal Thyroid] : the thyroid was normal [Normal Breath Sounds] : Normal breath sounds [Normal Heart Sounds] : normal heart sounds [0] : right 0 [1+] : right 1+ [2+] : left 2+ [Ankle Swelling (On Exam)] : not present [Varicose Veins Of Lower Extremities] : not present [] : not present [Abdomen Masses] : No abdominal masses [No Rash or Lesion] : No rash or lesion [Alert] : alert [Calm] : calm [de-identified] : Elderly woman, pleasant and conversational  [FreeTextEntry1] : Normal capillary refill. Normal skin turgor. No wounds or skin breakdown appreciated.

## 2022-09-07 NOTE — ASSESSMENT
[FreeTextEntry1] : 77 year old woman with PMH of HTN, HLD, CKD3, T2DM returns for a f/u on her right leg claudication. JIM performed in the office on 11/30/21 that  demonstrated diminished JIM of 0.71 on the R side as well as severely blunted ankle and metatarsal waveforms, consistent with arterial disease. Patient was recommended exercise therapy.\par Improved claudication, she is able to walk 5-6 blocks without need to stop.\par She was recommended to continue walking and exercising.\par She will f/u in 6 months or sooner if new symptoms.\par  [Arterial/Venous Disease] : arterial/venous disease

## 2022-09-07 NOTE — HISTORY OF PRESENT ILLNESS
[FreeTextEntry1] : 77 year old woman with PMH of HTN, HLD, CKD3, T2DM returns for a f/u on her right leg claudication. JIM performed in the office on 11/30/21 that  demonstrated diminished JIM of 0.71 on the R side as well as severely blunted ankle and metatarsal waveforms, consistent with arterial disease. Patient was recommended exercise therapy. She states that before she was could only walk 1-2 blocks and after she started exercise regimen she is able to walk now 5-6 blocks. She feels that she is definitely improved. Denies rest pain, skin breakdown.\par

## 2022-09-12 ENCOUNTER — APPOINTMENT (OUTPATIENT)
Dept: VASCULAR SURGERY | Facility: CLINIC | Age: 77
End: 2022-09-12

## 2022-09-23 ENCOUNTER — LABORATORY RESULT (OUTPATIENT)
Age: 77
End: 2022-09-23

## 2022-09-29 ENCOUNTER — APPOINTMENT (OUTPATIENT)
Dept: NEPHROLOGY | Facility: CLINIC | Age: 77
End: 2022-09-29

## 2022-09-29 VITALS
DIASTOLIC BLOOD PRESSURE: 78 MMHG | WEIGHT: 155 LBS | SYSTOLIC BLOOD PRESSURE: 128 MMHG | HEART RATE: 88 BPM | BODY MASS INDEX: 28.35 KG/M2

## 2022-09-29 PROCEDURE — 99214 OFFICE O/P EST MOD 30 MIN: CPT

## 2022-09-29 NOTE — PHYSICAL EXAM
[General Appearance - Alert] : alert [General Appearance - In No Acute Distress] : in no acute distress [General Appearance - Well Nourished] : well nourished [General Appearance - Well Developed] : well developed [General Appearance - Well-Appearing] : healthy appearing [] : no respiratory distress [Respiration, Rhythm And Depth] : normal respiratory rhythm and effort [Exaggerated Use Of Accessory Muscles For Inspiration] : no accessory muscle use [Auscultation Breath Sounds / Voice Sounds] : lungs were clear to auscultation bilaterally [Heart Rate And Rhythm] : heart rate was normal and rhythm regular [Heart Sounds] : normal S1 and S2 [Heart Sounds Gallop] : no gallops [Murmurs] : no murmurs [Heart Sounds Pericardial Friction Rub] : no pericardial rub [Edema] : there was no peripheral edema [No CVA Tenderness] : no ~M costovertebral angle tenderness [Oriented To Time, Place, And Person] : oriented to person, place, and time [Impaired Insight] : insight and judgment were intact

## 2022-09-29 NOTE — ASSESSMENT
[FreeTextEntry1] : all lab data was reviewed with patient in detail from 9/23/2022\par  78 yo woman  with CKD 3, microalbuminuria, HTN, uncontrolled DMT2 overweight\par -HTN- BP at goal  c/w present regimen;  also encouraged weight loss and exercise\par - CKD 3- eGFR stable 50-60 range\par c/w low protein diet, avoiding NSAIDs- consider sglt2i\par --microalbuminuria--  improved to 185 from 335, defer sglt2i  for now (prior has declined, now lower ACR)\par NB: angioedema with lisinopril-  c/w same plan: avoid RASi - if develops worsening proteinuria\par - DMT2-:  A1C at 8%-  ( was 9% spring down to 7.4 now back up) still needs to focus on optimizing to closer to 7% eg exercise weight loss, reduced CHO intake\par -hyperlipidemia-   elevated triglycerides- reduce CHO intake- reduce weight c/w Atorvastatin\par -overweight- emphasized need to lose weight-\par \par f/u OV 6 months \par

## 2022-09-29 NOTE — HISTORY OF PRESENT ILLNESS
[FreeTextEntry1] : 76 yo woman here for f/u evaluation of CKD 3, microalbuminuria, HTN, DMT2.\par allergy to lisinopril\par weight down a bit-\par vasc f/u- mild claudication- no intervention- needs to continue with walking- she reports she is feeling better!\par continues on same antihyperglycemic regimen- A1C was back up to 8%- \par No NSAIDs \par Denies flank pain, dysuria, hematuria or frothy urine \par No shortness of breath, no chest pain\par does say that she feels her heart beat fast at times\par \par \par NB: Off CEI after allergic rxn spring 2018

## 2022-10-21 ENCOUNTER — RX RENEWAL (OUTPATIENT)
Age: 77
End: 2022-10-21

## 2022-10-31 ENCOUNTER — RX RENEWAL (OUTPATIENT)
Age: 77
End: 2022-10-31

## 2022-11-03 ENCOUNTER — NON-APPOINTMENT (OUTPATIENT)
Age: 77
End: 2022-11-03

## 2022-11-03 ENCOUNTER — APPOINTMENT (OUTPATIENT)
Dept: OPHTHALMOLOGY | Facility: CLINIC | Age: 77
End: 2022-11-03

## 2022-11-03 PROCEDURE — 92014 COMPRE OPH EXAM EST PT 1/>: CPT

## 2022-11-03 PROCEDURE — 92133 CPTRZD OPH DX IMG PST SGM ON: CPT

## 2022-11-03 PROCEDURE — 92083 EXTENDED VISUAL FIELD XM: CPT

## 2022-11-08 ENCOUNTER — APPOINTMENT (OUTPATIENT)
Dept: ENDOCRINOLOGY | Facility: CLINIC | Age: 77
End: 2022-11-08

## 2022-11-08 VITALS
DIASTOLIC BLOOD PRESSURE: 83 MMHG | WEIGHT: 158 LBS | SYSTOLIC BLOOD PRESSURE: 142 MMHG | BODY MASS INDEX: 28.9 KG/M2 | HEART RATE: 114 BPM

## 2022-11-08 LAB — SARS-COV-2 N GENE NPH QL NAA+PROBE: NOT DETECTED

## 2022-11-08 PROCEDURE — 99214 OFFICE O/P EST MOD 30 MIN: CPT

## 2022-11-08 NOTE — DATA REVIEWED
[FreeTextEntry1] : 10/22 A1c 7.3% POC\par 9/22 A1c 8.0%, tot chol 151, trig 180, HDL 54, LDL 61, urine alb/cr 185, 25D 50.7\par 7/22  A1c 7.4%, POC\par 3/22: A1c 8.7% POC, 9.0% urine alb/cr 335, 25D 47.1, Ca 10.5, PTH 34\par 11/21: A1c 8.8%, tot chol 146, trig 134, HDL 56, LDL 63, urine microalbumin/cr 153\par 7/21: A1c 7.8%\par 3/21: uine microalbumin/cr 127\par 1/21: A1c 7.7%\par 8/20: A1c 7.3%, urine microalbumin/cr 111, 25D 47.9\par 6/20: A1c 7.5%, TSH 1.72, Covid Ab (+), tot chol 101, trig 142, HDL 59, LDL 63\par 11/19: A1c 8.5%, urine microalbumin/cr 218\par 2/20: A1C 8.3%\par 11/19: A1c 8.5%\par 7/19: A1c 8.5%, Cr 1.33, GFR 46\par 3/19: A1c 8.6%, tot chol 133, trig 87, HDL 59, LDL 57, TSH 2.65, Cr 1.33, urine microalbumin/cr 146\par 11/18: A1c 8.4%, tot chol 140, trig 85, HDL 59, LDL 64, urine microalbumin /cr 156\par 9/18: A1c 8.6%, tot chol 150, trig 92, HDL 56, LDL 76\par 7/18: A1c 8.5% point of care\par 4/18: A1c 8.8%, Cr 1.43, tot chol 214, trig 113, HDL 60, \par 10/17: A1c 7.8%, TSH 1.67\par 6/17: A1c 8.5%, urine microalb/cr 18\par 2/17: tot chol 122, trig 68, HDL 62, LDL 46, Cr 1.30, eGFR 42\par 12/16: A1c 8.3%, tot chol 196, trig 91, HDL 54, \par 9/16: A1c 8.1%\par 7/16: A1c 8.5%,  urine microalb/cr 96, TSH 2.52\par \par bone density 2/17, Hologic: \par L1-L4  1.060 T -0.8 (L1 T -1.2)\par tot hip 1.014, T -0.1\par fem neck 0.972, T 0.1

## 2022-11-08 NOTE — HISTORY OF PRESENT ILLNESS
[FreeTextEntry1] : forgot her glucometer but says sugars are 140-150s in the morning.  none over 200\par She had a bad tooth and reduced her night eating.\par no hypoglycemia symptoms.   has hypoglycemia rarely, if she doesn’t eat lunch or dinner\par was unable to get Ozempic 2mg, still is on 1mg dose\par will see PCP next month for flu vaccine\par last ophtho 5/22: mild-mod non proliferative DR.  She had follow up this week.\par labs reviewed from 9/22:  A1c 8%, but A1c in office is 7.3% today\par \par Meds: Tresiba 11 units/day\par Humalog 8-10  units for meals, eating two meals/day\par Ozempic 1mg/week\par metformin 1g bid \par chlorthalidone 25mg\par atorvastatin 20mg\par vitamin D\par pantoprazole\par Previous meds: lisinopril (lip swelling), Victoza (too expensive), bupropion (nausea), Trulicity (changed to Ozempic)

## 2022-11-08 NOTE — PHYSICAL EXAM
[Alert] : alert [No Acute Distress] : no acute distress [No Proptosis] : no proptosis [No Lid Lag] : no lid lag [Normal Hearing] : hearing was normal [No LAD] : no lymphadenopathy [Thyroid Not Enlarged] : the thyroid was not enlarged [Clear to Auscultation] : lungs were clear to auscultation bilaterally [Normal S1, S2] : normal S1 and S2 [Regular Rhythm] : with a regular rhythm [No Edema] : no peripheral edema [Pedal Pulses Normal] : the pedal pulses are present [Normal Sensation on Monofilament Testing] : normal sensation on monofilament testing of lower extremities [Normal Affect] : the affect was normal [Normal Mood] : the mood was normal [Foot Ulcers] : no foot ulcers [de-identified] :  looks younger than age [de-identified] : reg tachy [de-identified] : no onychomycoses, mild to moderate acanthosis nigricans

## 2022-11-08 NOTE — ASSESSMENT
[FreeTextEntry1] : Diabetes with multiple complications, microalbuminuria on ACEI.  \par Change Ozempic to Mounjaro 5mg/week. continue Tresiba, reduce to 10 units hs (samples given, #3 pens)\par continue Humalog for meals.\par Suggested using Armin 3 CGM to more easily monitor sugars.  She requires frequent glucose monitoring to determine insulin dosing and monitor for hypoglycemia. She will think about it.\par continue statin therapy\par RTO 4 months\par

## 2022-11-10 ENCOUNTER — OUTPATIENT (OUTPATIENT)
Dept: OUTPATIENT SERVICES | Facility: HOSPITAL | Age: 77
LOS: 1 days | Discharge: HOME | End: 2022-11-10
Payer: MEDICARE

## 2022-11-10 ENCOUNTER — TRANSCRIPTION ENCOUNTER (OUTPATIENT)
Age: 77
End: 2022-11-10

## 2022-11-10 ENCOUNTER — RESULT REVIEW (OUTPATIENT)
Age: 77
End: 2022-11-10

## 2022-11-10 ENCOUNTER — APPOINTMENT (OUTPATIENT)
Dept: GASTROENTEROLOGY | Facility: HOSPITAL | Age: 77
End: 2022-11-10

## 2022-11-10 VITALS
WEIGHT: 158.95 LBS | TEMPERATURE: 98 F | RESPIRATION RATE: 18 BRPM | DIASTOLIC BLOOD PRESSURE: 152 MMHG | HEART RATE: 107 BPM | OXYGEN SATURATION: 99 %

## 2022-11-10 LAB — GLUCOSE BLDC GLUCOMTR-MCNC: 197 MG/DL — HIGH (ref 70–99)

## 2022-11-10 PROCEDURE — 88305 TISSUE EXAM BY PATHOLOGIST: CPT

## 2022-11-10 PROCEDURE — 82962 GLUCOSE BLOOD TEST: CPT

## 2022-11-10 PROCEDURE — 45380 COLONOSCOPY AND BIOPSY: CPT

## 2022-11-10 PROCEDURE — 88305 TISSUE EXAM BY PATHOLOGIST: CPT | Mod: 26

## 2022-11-10 NOTE — PRE-ANESTHESIA EVALUATION ADULT - MALLAMPATI CLASS
conducted a detailed discussion...
Class I (easy) - visualization of the soft palate, fauces, uvula, and both anterior and posterior pillars

## 2022-11-15 LAB — SURGICAL PATHOLOGY STUDY: SIGNIFICANT CHANGE UP

## 2022-11-29 ENCOUNTER — APPOINTMENT (OUTPATIENT)
Dept: GASTROENTEROLOGY | Facility: CLINIC | Age: 77
End: 2022-11-29

## 2022-11-29 VITALS
BODY MASS INDEX: 28.71 KG/M2 | SYSTOLIC BLOOD PRESSURE: 138 MMHG | HEIGHT: 62 IN | TEMPERATURE: 97.2 F | RESPIRATION RATE: 15 BRPM | WEIGHT: 156 LBS | OXYGEN SATURATION: 98 % | DIASTOLIC BLOOD PRESSURE: 70 MMHG | HEART RATE: 102 BPM

## 2022-11-29 PROCEDURE — 99212 OFFICE O/P EST SF 10 MIN: CPT

## 2022-11-29 NOTE — PHYSICAL EXAM
[General Appearance - Alert] : alert [Sclera] : the sclera and conjunctiva were normal [Outer Ear] : the ears and nose were normal in appearance [Neck Appearance] : the appearance of the neck was normal [] : no respiratory distress [Abnormal Walk] : normal gait [Skin Color & Pigmentation] : normal skin color and pigmentation [Cranial Nerves] : cranial nerves 2-12 were intact [Oriented To Time, Place, And Person] : oriented to person, place, and time

## 2022-11-29 NOTE — HISTORY OF PRESENT ILLNESS
[de-identified] : 78 yo female with hx GERD,  DM2, HTN, CKD, uterine ca 1974 (no RT - only Comb's biopsy) here for colonoscopy for hx of tubular adenoma in 2017 and then repeat cscope in Nov 2022; referred by Dr. Rivers. \par \par CSCOPE 11/15/22: \par \par Findings: \par  \par Mucosa Lipomatous IC valve was noted \par  \par Protruding lesions A single sessile 3 mm polyp was found in the cecum.A \par single-piece polypectomy was performed using a cold forceps. The polyp was \par completely removed. \par  \par A single sessile 4 mm polyp was found in the transverse colon.A single-piece \par polypectomy was performed using a cold forceps. The polyp was completely \par removed. \par  \par Additional findings The colon was otherwise unremarkable. \par  \par Impressions: \par  \par Polyp (3 mm) in the cecum. (Polypectomy). \par  \par Polyp (4 mm) in the transverse colon. (Polypectomy). 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 \par  \par The colon was otherwise unremarkable. \par  \par Plan: \par  \par Await pathology results. \par  \par Discharge to home \par  \par Patient will call office if any worrisome complaints, anticipatory guidance \par discussed \par  \par Resume Previous Diet \par  \par Will consider surveillance colonoscopy for CRC screening in 7-10 yrs depending \par on the QOL and patient preference \par \par PATH: 1. Cecum, polyp; polypectomy:\par - Colonic mucosa with lymphoid aggregate. \par \par \par 2. Colon, transverse, polyp; polypectomy:\par - Hyperplastic polyp.\par \par Reports she is generally feeling well. Reports heartburn symptoms are on occasion and takes famotidine prn. + thick clear phlegm. No red flag sx.  No odynophagia or dysphagia.  Not following with dietician. Denies fever, chill, cp, sob, abd pain, nausea, vomiting, diarrhea, constipation, hematochezia, flatulence, belching, unintentional wt loss or loss of appetite\par \par Pt reports BM BID formed, no blood, no mucus.  Admits to complete evacuation but does notice "smearing" on toilet paper even if she didn't have a BM. Having soft stools. \par \par She is compliant with her medications including ozempic, metformin and insulin.  \par \par C-scope 2017: ascending colon 3-5 mm polyp, probable lipoma in ascending colon. PATH: TA

## 2022-11-29 NOTE — ASSESSMENT
[FreeTextEntry1] : 76 yo female with hx GERD,  DM2, HTN, CKD, Comb's biopsy, s/p CSCOPE Nov 2022 for hx of tubular adenoma in 2017 with recommendation to repeat in 2022; referred by Dr. Rivers. \par \par Hx tubular adenoma in 2017 - repeat CSCOPE this month showed 2 benign polyps < 10mm\par - reviewed CSCOPE and pathology findings in detail with patient; answered all questions\par - consider surveillance cscope pending on QOL and patient preference in 7-10 years \par - reach out sooner if any worrisome symptoms including but not limited to unintentional weight loss, abd pain, change in bowel habits, blood in stool\par \par Dyspepsia\par - H2 blocker PRN (using twice/month) due to food triggers like high fat foods and spicy food \par \par Stool "smearing"\par - cont metamucil - advised to take daily instead of twice/week \par - Will assess the need for ARM testing if worsens as this patient declines at this time\par \par F/U as needed

## 2022-11-29 NOTE — CONSULT LETTER
[Dear  ___] : Dear  [unfilled], [( Thank you for referring [unfilled] for consultation for _____ )] : Thank you for referring [unfilled] for consultation for [unfilled] [Please see my note below.] : Please see my note below. [Sincerely,] : Sincerely, [FreeTextEntry3] : Nia Wilcox NP

## 2022-12-13 ENCOUNTER — APPOINTMENT (OUTPATIENT)
Dept: INTERNAL MEDICINE | Facility: CLINIC | Age: 77
End: 2022-12-13

## 2022-12-13 VITALS
HEIGHT: 62 IN | DIASTOLIC BLOOD PRESSURE: 83 MMHG | OXYGEN SATURATION: 99 % | HEART RATE: 96 BPM | WEIGHT: 159 LBS | BODY MASS INDEX: 29.26 KG/M2 | TEMPERATURE: 97 F | SYSTOLIC BLOOD PRESSURE: 145 MMHG

## 2022-12-13 DIAGNOSIS — Z00.00 ENCOUNTER FOR GENERAL ADULT MEDICAL EXAMINATION W/OUT ABNORMAL FINDINGS: ICD-10-CM

## 2022-12-13 DIAGNOSIS — R00.2 PALPITATIONS: ICD-10-CM

## 2022-12-13 DIAGNOSIS — Z23 ENCOUNTER FOR IMMUNIZATION: ICD-10-CM

## 2022-12-13 PROCEDURE — 90662 IIV NO PRSV INCREASED AG IM: CPT

## 2022-12-13 PROCEDURE — 99397 PER PM REEVAL EST PAT 65+ YR: CPT | Mod: 25

## 2022-12-13 PROCEDURE — G0008: CPT

## 2022-12-13 PROCEDURE — 36415 COLL VENOUS BLD VENIPUNCTURE: CPT

## 2022-12-13 PROCEDURE — 99213 OFFICE O/P EST LOW 20 MIN: CPT | Mod: 25

## 2022-12-14 LAB — TSH SERPL-ACNC: 2.1 UIU/ML

## 2023-01-04 NOTE — REVIEW OF SYSTEMS
[Nl] : Genitourinary 62-year-old female with history of type 2 diabetes mellitus, hypertension, cervical radiculopathy presents to the ER status post syncopal episode while in physical therapy today.  Patient states she had fasting blood work this morning had some something small to eat prior to physical therapy.  While in physical therapy patient felt like she was going to pass out and had loss of consciousness for a few minutes.  Patient states similar episode a few years previously also during fasting blood work.  Patient denies any headache, dizziness, chest pain, palpitations, shortness of breath, sick contacts, recent travel.

## 2023-01-10 ENCOUNTER — RX RENEWAL (OUTPATIENT)
Age: 78
End: 2023-01-10

## 2023-02-01 ENCOUNTER — RX RENEWAL (OUTPATIENT)
Age: 78
End: 2023-02-01

## 2023-03-02 ENCOUNTER — APPOINTMENT (OUTPATIENT)
Dept: OPHTHALMOLOGY | Facility: CLINIC | Age: 78
End: 2023-03-02
Payer: MEDICARE

## 2023-03-02 ENCOUNTER — NON-APPOINTMENT (OUTPATIENT)
Age: 78
End: 2023-03-02

## 2023-03-02 PROCEDURE — 92012 INTRM OPH EXAM EST PATIENT: CPT

## 2023-03-02 PROCEDURE — 92250 FUNDUS PHOTOGRAPHY W/I&R: CPT

## 2023-03-09 ENCOUNTER — APPOINTMENT (OUTPATIENT)
Dept: ENDOCRINOLOGY | Facility: CLINIC | Age: 78
End: 2023-03-09
Payer: MEDICARE

## 2023-03-09 VITALS
DIASTOLIC BLOOD PRESSURE: 83 MMHG | HEART RATE: 106 BPM | WEIGHT: 159 LBS | BODY MASS INDEX: 29.26 KG/M2 | SYSTOLIC BLOOD PRESSURE: 151 MMHG | HEIGHT: 62 IN

## 2023-03-09 LAB
GLUCOSE BLDC GLUCOMTR-MCNC: 224
HBA1C MFR BLD HPLC: 8.1

## 2023-03-09 PROCEDURE — 99214 OFFICE O/P EST MOD 30 MIN: CPT | Mod: 25

## 2023-03-09 PROCEDURE — 82962 GLUCOSE BLOOD TEST: CPT

## 2023-03-09 PROCEDURE — 83036 HEMOGLOBIN GLYCOSYLATED A1C: CPT | Mod: QW

## 2023-03-09 RX ORDER — ISOPROPYL ALCOHOL 0.75 G/1
SWAB TOPICAL
Qty: 600 | Refills: 3 | Status: ACTIVE | COMMUNITY
Start: 2021-12-02 | End: 1900-01-01

## 2023-03-09 RX ORDER — SEMAGLUTIDE 2.68 MG/ML
8 INJECTION, SOLUTION SUBCUTANEOUS
Qty: 3 | Refills: 1 | Status: DISCONTINUED | COMMUNITY
Start: 2022-01-21 | End: 2023-03-09

## 2023-03-09 RX ORDER — 70%ISOPROPYL ALCOHOL 0.7 ML/ML
70 SWAB TOPICAL
Qty: 200 | Refills: 3 | Status: DISCONTINUED | COMMUNITY
Start: 2020-09-14 | End: 2023-03-09

## 2023-03-09 RX ORDER — SEMAGLUTIDE 1.34 MG/ML
4 INJECTION, SOLUTION SUBCUTANEOUS
Qty: 9 | Refills: 1 | Status: DISCONTINUED | COMMUNITY
Start: 2022-10-21 | End: 2023-03-09

## 2023-03-10 NOTE — ASSESSMENT
[FreeTextEntry1] : Diabetes with multiple complications, microalbuminuria on ACEI.  Suboptimal. \par Titrate Mounjaro to 7.5mg/week once her supply is finished; reduce Tresiba to 8 units/day to prevent hypoglycemia and reduce Humalog to 6 units with meals, 4 units with snacks though I prefer she not snack at night.\par Explained goal of increasing Mounjaro and reducing insulin, but she also has to reduce snacking/eating and increase exercise to help maintain better sugar control \par continue statin therapy\par RTO 4 months\par

## 2023-03-10 NOTE — HISTORY OF PRESENT ILLNESS
[FreeTextEntry1] : Changed to Mounjaro almost three weeks ago.  She took 2 doses so far; third dose will be tomorrow.\par So far, no side effects\par Ultra2 meter reviewed:   14d avg 187 (n26), 30d avg 188 (n53).  testing 2-3x/day.   She tests mostly in pm\par 204, 180, 141, 53 (6p), 157, 237, 223, 171, 146, 228, 214.\par Highest sugars are around midnight, due to snacking at night.\par She is not interested in Gio CGM at this time.\par last ophtho 3/23: mild-mod non proliferative DR.  \par A1c is 8.1% today, increased from 7.3% last time (she wasn't eating at night last time, due to dental issues)\par \par Meds: Tresiba 10 units/day\par Humalog 8  units for meals, eating two meals/day\par Mounjaro 5mg/week\par metformin 1g bid \par chlorthalidone 25mg\par atorvastatin 20mg\par vitamin D\par pantoprazole\par Previous meds: lisinopril (lip swelling), Victoza (too expensive), bupropion (nausea), Trulicity (changed to Ozempic)

## 2023-03-10 NOTE — DATA REVIEWED
[FreeTextEntry1] : 3/23  A1c 8.1% POC\par 10/22 A1c 7.3% POC\par 9/22 A1c 8.0%, tot chol 151, trig 180, HDL 54, LDL 61, urine alb/cr 185, 25D 50.7\par 7/22  A1c 7.4%, POC\par 3/22: A1c 8.7% POC, 9.0% urine alb/cr 335, 25D 47.1, Ca 10.5, PTH 34\par 11/21: A1c 8.8%, tot chol 146, trig 134, HDL 56, LDL 63, urine microalbumin/cr 153\par 7/21: A1c 7.8%\par 3/21: uine microalbumin/cr 127\par 1/21: A1c 7.7%\par 8/20: A1c 7.3%, urine microalbumin/cr 111, 25D 47.9\par 6/20: A1c 7.5%, TSH 1.72, Covid Ab (+), tot chol 101, trig 142, HDL 59, LDL 63\par 11/19: A1c 8.5%, urine microalbumin/cr 218\par 2/20: A1C 8.3%\par 11/19: A1c 8.5%\par 7/19: A1c 8.5%, Cr 1.33, GFR 46\par 3/19: A1c 8.6%, tot chol 133, trig 87, HDL 59, LDL 57, TSH 2.65, Cr 1.33, urine microalbumin/cr 146\par 11/18: A1c 8.4%, tot chol 140, trig 85, HDL 59, LDL 64, urine microalbumin /cr 156\par 9/18: A1c 8.6%, tot chol 150, trig 92, HDL 56, LDL 76\par 7/18: A1c 8.5% point of care\par 4/18: A1c 8.8%, Cr 1.43, tot chol 214, trig 113, HDL 60, \par 10/17: A1c 7.8%, TSH 1.67\par 6/17: A1c 8.5%, urine microalb/cr 18\par 2/17: tot chol 122, trig 68, HDL 62, LDL 46, Cr 1.30, eGFR 42\par 12/16: A1c 8.3%, tot chol 196, trig 91, HDL 54, \par 9/16: A1c 8.1%\par 7/16: A1c 8.5%,  urine microalb/cr 96, TSH 2.52\par \par bone density 2/17, Hologic: \par L1-L4  1.060 T -0.8 (L1 T -1.2)\par tot hip 1.014, T -0.1\par fem neck 0.972, T 0.1

## 2023-03-21 LAB
ALBUMIN SERPL ELPH-MCNC: 4.5 G/DL
ALP BLD-CCNC: 98 U/L
ALT SERPL-CCNC: 11 U/L
ANION GAP SERPL CALC-SCNC: 12 MMOL/L
APPEARANCE: CLEAR
AST SERPL-CCNC: 14 U/L
BACTERIA: NEGATIVE
BASOPHILS # BLD AUTO: 0.06 K/UL
BASOPHILS NFR BLD AUTO: 0.7 %
BILIRUB SERPL-MCNC: 0.5 MG/DL
BILIRUBIN URINE: NEGATIVE
BLOOD URINE: NEGATIVE
BUN SERPL-MCNC: 26 MG/DL
CALCIUM SERPL-MCNC: 10.1 MG/DL
CHLORIDE SERPL-SCNC: 100 MMOL/L
CHOLEST SERPL-MCNC: 156 MG/DL
CO2 SERPL-SCNC: 29 MMOL/L
COLOR: YELLOW
CREAT SERPL-MCNC: 1.23 MG/DL
CREAT SPEC-SCNC: 158 MG/DL
EGFR: 46 ML/MIN/1.73M2
EOSINOPHIL # BLD AUTO: 0.21 K/UL
EOSINOPHIL NFR BLD AUTO: 2.3 %
GLUCOSE QUALITATIVE U: NEGATIVE
GLUCOSE SERPL-MCNC: 125 MG/DL
HCT VFR BLD CALC: 41.7 %
HDLC SERPL-MCNC: 64 MG/DL
HGB BLD-MCNC: 14.2 G/DL
HYALINE CASTS: 3 /LPF
IMM GRANULOCYTES NFR BLD AUTO: 0.3 %
KETONES URINE: NEGATIVE
LDLC SERPL CALC-MCNC: 62 MG/DL
LEUKOCYTE ESTERASE URINE: NEGATIVE
LYMPHOCYTES # BLD AUTO: 3.4 K/UL
LYMPHOCYTES NFR BLD AUTO: 37.4 %
MAN DIFF?: NORMAL
MCHC RBC-ENTMCNC: 30.1 PG
MCHC RBC-ENTMCNC: 34.1 GM/DL
MCV RBC AUTO: 88.3 FL
MICROALBUMIN 24H UR DL<=1MG/L-MCNC: 33.3 MG/DL
MICROALBUMIN/CREAT 24H UR-RTO: 211 MG/G
MICROSCOPIC-UA: NORMAL
MONOCYTES # BLD AUTO: 0.72 K/UL
MONOCYTES NFR BLD AUTO: 7.9 %
NEUTROPHILS # BLD AUTO: 4.66 K/UL
NEUTROPHILS NFR BLD AUTO: 51.4 %
NITRITE URINE: NEGATIVE
NONHDLC SERPL-MCNC: 92 MG/DL
PH URINE: 6.5
PLATELET # BLD AUTO: 353 K/UL
POTASSIUM SERPL-SCNC: 4.5 MMOL/L
PROT SERPL-MCNC: 7 G/DL
PROTEIN URINE: ABNORMAL
RBC # BLD: 4.72 M/UL
RBC # FLD: 13.7 %
RED BLOOD CELLS URINE: 1 /HPF
SODIUM SERPL-SCNC: 142 MMOL/L
SPECIFIC GRAVITY URINE: 1.02
SQUAMOUS EPITHELIAL CELLS: 9 /HPF
TRIGL SERPL-MCNC: 150 MG/DL
UROBILINOGEN URINE: NORMAL
WBC # FLD AUTO: 9.08 K/UL
WHITE BLOOD CELLS URINE: 5 /HPF

## 2023-03-23 ENCOUNTER — APPOINTMENT (OUTPATIENT)
Dept: NEPHROLOGY | Facility: CLINIC | Age: 78
End: 2023-03-23
Payer: MEDICARE

## 2023-03-23 VITALS — SYSTOLIC BLOOD PRESSURE: 122 MMHG | HEART RATE: 76 BPM | DIASTOLIC BLOOD PRESSURE: 78 MMHG

## 2023-03-23 DIAGNOSIS — Z86.39 PERSONAL HISTORY OF OTHER ENDOCRINE, NUTRITIONAL AND METABOLIC DISEASE: ICD-10-CM

## 2023-03-23 PROCEDURE — 99214 OFFICE O/P EST MOD 30 MIN: CPT

## 2023-04-05 NOTE — ASSESSMENT
[FreeTextEntry1] : all lab data was reviewed with patient in detail from 3/20/2023\par  78 yo woman  with CKD 3, microalbuminuria, HTN, uncontrolled DMT2 overweight\par -HTN- BP controlled c/w present regimen; encouraged weight loss and exercise\par - CKD 3- creat 1.23- stable range eGFR 45 range\par c/w low protein diet, avoiding NSAIDs- \par discussed that she would benefit from sglt2i- asking if she could come off another med- will d/w Dr. Rod\par and let pt know\par --microalbuminuria-- stable 211- as above add sglt2i\par NB: angioedema with lisinopril-  avoid RASi \par - DMT2-:  A1C at 8.1%-   meds adjusted as above\par for repeat data soon\par -hyperlipidemia-   chol/LDL good- triglycerides improving and expect further improvement with improvement in glycemic control;  c/w Atorvastatin\par -overweight- emphasized need to lose weight-\par \par f/u OV  4 months with new labs prior\par \par 4/5/2023 ADDENDUM: did review sglt2i with Dr. Rod and then spoke with pt again- okay to add- might not be able to reduce antihyperglycemic pills, but may be able to use less insulin. Since she has been having good weight loss results with mounjaro, will defer addition of sglt2i- repeat data with f/u OV 8/2023 and add if still with elevated Uacr

## 2023-04-05 NOTE — HISTORY OF PRESENT ILLNESS
[FreeTextEntry1] : 78 yo woman with CKD 3, microalbuminuria, HTN, DMT2, here for follow up evaluation.\par allergy to lisinopril\par reports that A1C still up -8.51%- started on mounjaro about 3 weeks ago\par leg pain about the same- still trying to walk- vasc f/u- mild claudication- no intervention- needs to continue with \par No NSAIDs \par Denies flank pain, dysuria, hematuria or frothy urine \par No shortness of breath, no chest pain\par does say that she feels her heart beat fast at times\par \par \par NB: Off CEI after allergic rxn spring 2018

## 2023-04-12 ENCOUNTER — APPOINTMENT (OUTPATIENT)
Dept: INTERNAL MEDICINE | Facility: CLINIC | Age: 78
End: 2023-04-12
Payer: MEDICARE

## 2023-04-12 VITALS
TEMPERATURE: 97.6 F | HEIGHT: 62 IN | OXYGEN SATURATION: 99 % | WEIGHT: 156 LBS | DIASTOLIC BLOOD PRESSURE: 86 MMHG | HEART RATE: 100 BPM | BODY MASS INDEX: 28.71 KG/M2 | SYSTOLIC BLOOD PRESSURE: 151 MMHG

## 2023-04-12 DIAGNOSIS — M25.512 PAIN IN LEFT SHOULDER: ICD-10-CM

## 2023-04-12 PROCEDURE — 99214 OFFICE O/P EST MOD 30 MIN: CPT

## 2023-04-26 ENCOUNTER — APPOINTMENT (OUTPATIENT)
Dept: HEART AND VASCULAR | Facility: CLINIC | Age: 78
End: 2023-04-26
Payer: MEDICARE

## 2023-04-26 VITALS
HEART RATE: 96 BPM | DIASTOLIC BLOOD PRESSURE: 80 MMHG | TEMPERATURE: 98.6 F | BODY MASS INDEX: 28.71 KG/M2 | HEIGHT: 62 IN | WEIGHT: 156 LBS | OXYGEN SATURATION: 99 % | SYSTOLIC BLOOD PRESSURE: 144 MMHG

## 2023-04-26 PROCEDURE — 99214 OFFICE O/P EST MOD 30 MIN: CPT | Mod: 25

## 2023-04-26 PROCEDURE — 99204 OFFICE O/P NEW MOD 45 MIN: CPT | Mod: 25

## 2023-04-26 PROCEDURE — 93925 LOWER EXTREMITY STUDY: CPT

## 2023-05-24 NOTE — PRE-ANESTHESIA EVALUATION ADULT - NSANTHBPHIGHRD_ENT_A_CORE
I will continue Hydrocortisone 5 mg 2 tablets (10 mg) in the morning and 5 mg 1 tablet in the evening.  I recommend wearing a medic alert bracelet for adrenal insufficiency.  I recommend sick day rules: take 3x the dose for 3 days of hydrocortisone for major illness (fever >100.4) or major injury or surgery (30 mg in the morning and 15 mg in the evening). Take 2x the dose for 3 days (20 mg in the morning and 10 in the evening) for minor illness (cold without fever, minor injury).  For illness with vomiting, high fever, major injury go to the emergency department  I recommend carrying IM hydrocortisone 100 mg intramuscularly for emergencies and for camping or distant travel.   I will continue Levothyroxine 75 mcg 1 tablet daily.   Goal FT4 1.1-1.5.   I will continue Desmopressin 0.10 mg 2 tablets in the morning.  I advised the patient to increase Vitamin D3 2000 International Units daily.  I advised the patient that the Desmopressin can cause the patient's sodium to decrease.   The symptoms of hypothyroidism including constipation, lethargy, weight gain, myalgias, arthralgias, hair loss, depression and mental fogginess were reviewed.  Similarly, the symptoms of hyperthyroidism, or over-replacement with thyroid hormone, were reviewed including insomnia, anxiety, agitation, palpitations, diaphoresis, weight loss, hyperdefecation and muscle weakness.  The patient was informed how to properly take thyroid hormone replacement.  It is best taken on an empty stomach and should not be taken with iron, calcium, sucralfate or cholestyramine.  The patient was reminded that a 5-10% change in weight may alter the dosing requirement of thyroid hormone.  The patient was reminded to stay with a single brand name of thyroid hormone as they are not consistently dosed between the brands.  Additionally, the risks and benefits and alternatives to treatment were reviewed.  The primary risks are from over-replacement of thyroid hormones  No and include the above-mentioned symptoms as well as risks for atrial fibrillation, cardiomyopathy and premature bone loss.   I discussed with the patient they should take Levothyroxine in the morning 30 minutes before eating and they should not take any supplements, especially calcium for 4 hours after taking the Levothyroxine.   reviewed labs in great detail with the patient.  I reviewed BMD in great detail with the patient.  I recommend Reclast 5 mg infusion to treat osteoporosis. Patient is agreeable.  I will start prior authorization Reclast 5 mg infusion.  I will schedule Reclast 5 mg infusion once a year when approved.  I explained side effects of Reclast include flu-like syndrome for 2 days.   I advised the patient to take tylenol 500 mg 1 hour prior to infusion and every 6-8 hours for 2 days.  I advised the patient to stay hydrated drinking 8-10 glasses of water daily  I will repeat BMD scan in 1 year  I discussed fall prevention in great detail with the patient.  I advised the patient to call the endocrine clinic if the patient has a fracture or if needs a tooth extraction or dental implant.  I advised the patient to tell their dentist they are on Reclast.  I encouraged the patient to participate in weight-bearing exercise such as walking or yoga or Jalil Chi at least 30 minutes 3-5 days a week as tolerated.   To schedule Reclast, call [facility] department at [phone].     I will check labs in 2-3 weeks after patient resumes eating high potassium diet, check potassium.  If potassium remains low, plan for patient to review with PCP.  I advised the patient to have annual eye exams with field of vision exam.   I will check labs in 1 year, albumin, calcium, 25 vitamin D, PTH, BMP, prolactin, FT4            FOLLOW-UP    Please complete all ordered labs and imaging at least 1 week prior to your next office visit or your visit may need to be rescheduled.     It is recommended you schedule a  follow-up appointment with Dr. Austin or Karyna Stewart NP around No follow-ups on file..    Office hours are 8:00 am to 5:00 pm Monday through Friday.  If it is urgent that you speak with someone outside of these hours, the [facility] will be able to assist you.  You can reach the office by calling [phone].    Thank you for choosing Armin Austin MD and Karyna Stewart NP as your Endocrinology provider.    At [facility] in [place], one important tool we use to improve our patient services is our Patient Survey.  Following your visit you may receive our survey in the mail.     Please take the time to complete the survey.     If your visit with us was great, we want to hear about it.     If we can improve, please let us know how.

## 2023-05-30 ENCOUNTER — RX RENEWAL (OUTPATIENT)
Age: 78
End: 2023-05-30

## 2023-06-06 ENCOUNTER — APPOINTMENT (OUTPATIENT)
Dept: HEART AND VASCULAR | Facility: CLINIC | Age: 78
End: 2023-06-06
Payer: MEDICARE

## 2023-06-06 VITALS
HEIGHT: 62 IN | SYSTOLIC BLOOD PRESSURE: 146 MMHG | DIASTOLIC BLOOD PRESSURE: 86 MMHG | WEIGHT: 158 LBS | HEART RATE: 86 BPM | OXYGEN SATURATION: 98 % | BODY MASS INDEX: 29.08 KG/M2 | TEMPERATURE: 97.6 F

## 2023-06-06 DIAGNOSIS — K21.9 GASTRO-ESOPHAGEAL REFLUX DISEASE W/OUT ESOPHAGITIS: ICD-10-CM

## 2023-06-06 PROCEDURE — 93923 UPR/LXTR ART STDY 3+ LVLS: CPT

## 2023-06-06 PROCEDURE — ZZZZZ: CPT

## 2023-06-06 PROCEDURE — 99214 OFFICE O/P EST MOD 30 MIN: CPT | Mod: 25

## 2023-06-27 ENCOUNTER — APPOINTMENT (OUTPATIENT)
Dept: ENDOCRINOLOGY | Facility: CLINIC | Age: 78
End: 2023-06-27
Payer: MEDICARE

## 2023-06-27 VITALS
BODY MASS INDEX: 28.9 KG/M2 | HEART RATE: 90 BPM | SYSTOLIC BLOOD PRESSURE: 143 MMHG | DIASTOLIC BLOOD PRESSURE: 84 MMHG | WEIGHT: 158 LBS

## 2023-06-27 LAB
GLUCOSE BLDC GLUCOMTR-MCNC: 137
HBA1C MFR BLD HPLC: 7.8

## 2023-06-27 PROCEDURE — 99214 OFFICE O/P EST MOD 30 MIN: CPT | Mod: 25

## 2023-06-27 PROCEDURE — 83036 HEMOGLOBIN GLYCOSYLATED A1C: CPT | Mod: QW

## 2023-06-27 PROCEDURE — 82962 GLUCOSE BLOOD TEST: CPT

## 2023-06-27 RX ORDER — INSULIN DEGLUDEC INJECTION 100 U/ML
100 INJECTION, SOLUTION SUBCUTANEOUS
Qty: 2 | Refills: 1 | Status: DISCONTINUED | COMMUNITY
Start: 2019-11-13 | End: 2023-06-27

## 2023-06-27 NOTE — PHYSICAL EXAM
[Alert] : alert [No Acute Distress] : no acute distress [No Proptosis] : no proptosis [No Lid Lag] : no lid lag [Normal Hearing] : hearing was normal [No LAD] : no lymphadenopathy [Thyroid Not Enlarged] : the thyroid was not enlarged [Clear to Auscultation] : lungs were clear to auscultation bilaterally [Normal S1, S2] : normal S1 and S2 [Regular Rhythm] : with a regular rhythm [No Edema] : no peripheral edema [Normal Sensation on Monofilament Testing] : normal sensation on monofilament testing of lower extremities [Normal Affect] : the affect was normal [Normal Mood] : the mood was normal [Foot Ulcers] : no foot ulcers [de-identified] : fingerstick 137 [de-identified] : 1+ DP pulses [de-identified] : no onychomycoses.  small corn near heel.  mild acanthosis nigricans

## 2023-06-27 NOTE — ASSESSMENT
[FreeTextEntry1] : Diabetes with multiple complications, microalbuminuria on ACEI.  \par Continue Mounjaro, titrate dose to 10mg/week.  Once she gets the 10mg dose, she can discontinue Tresiba.\par Start Jardiance 10mg/day; potential side effects  discussed: increased urination, increased risk of urinary/genital infection, low BP, dizziness. Advised to drink extra water (avoid dehydration) and empty bladder frequently.   samples given (#21) and she will let me know to send rx, if she is not having side effects and which SGLT2 is preferred (Jardiance or Farxiga)\par continue statin therapy\par Reduce Humalog to 6 units with meals.  continue metformin 1g bid.\par continue statin therapy\par RTO 4 months\par

## 2023-06-29 ENCOUNTER — APPOINTMENT (OUTPATIENT)
Dept: OPHTHALMOLOGY | Facility: CLINIC | Age: 78
End: 2023-06-29
Payer: MEDICARE

## 2023-06-29 ENCOUNTER — NON-APPOINTMENT (OUTPATIENT)
Age: 78
End: 2023-06-29

## 2023-06-29 PROCEDURE — 92134 CPTRZ OPH DX IMG PST SGM RTA: CPT

## 2023-06-29 PROCEDURE — 92014 COMPRE OPH EXAM EST PT 1/>: CPT

## 2023-08-02 LAB
ALBUMIN SERPL ELPH-MCNC: 4.8 G/DL
ANION GAP SERPL CALC-SCNC: 13 MMOL/L
APPEARANCE: CLEAR
BACTERIA: NEGATIVE /HPF
BILIRUBIN URINE: NEGATIVE
BLOOD URINE: NEGATIVE
BUN SERPL-MCNC: 30 MG/DL
CALCIUM SERPL-MCNC: 10.3 MG/DL
CALCIUM SERPL-MCNC: 10.3 MG/DL
CAST: 0 /LPF
CHLORIDE SERPL-SCNC: 102 MMOL/L
CHOLEST SERPL-MCNC: 167 MG/DL
CO2 SERPL-SCNC: 28 MMOL/L
COLOR: YELLOW
CREAT SERPL-MCNC: 1.17 MG/DL
CREAT SPEC-SCNC: 148 MG/DL
EGFR: 48 ML/MIN/1.73M2
EPITHELIAL CELLS: 1 /HPF
ESTIMATED AVERAGE GLUCOSE: 169 MG/DL
GLUCOSE QUALITATIVE U: NEGATIVE MG/DL
GLUCOSE SERPL-MCNC: 147 MG/DL
HBA1C MFR BLD HPLC: 7.5 %
HDLC SERPL-MCNC: 61 MG/DL
KETONES URINE: NEGATIVE MG/DL
LDLC SERPL CALC-MCNC: 86 MG/DL
LEUKOCYTE ESTERASE URINE: NEGATIVE
MICROALBUMIN 24H UR DL<=1MG/L-MCNC: 36.6 MG/DL
MICROALBUMIN/CREAT 24H UR-RTO: 247 MG/G
MICROSCOPIC-UA: NORMAL
NITRITE URINE: NEGATIVE
NONHDLC SERPL-MCNC: 106 MG/DL
PARATHYROID HORMONE INTACT: 44 PG/ML
PH URINE: 5.5
PHOSPHATE SERPL-MCNC: 3.1 MG/DL
POTASSIUM SERPL-SCNC: 4.3 MMOL/L
PROTEIN URINE: 100 MG/DL
RED BLOOD CELLS URINE: 1 /HPF
SODIUM SERPL-SCNC: 143 MMOL/L
SPECIFIC GRAVITY URINE: 1.02
TRIGL SERPL-MCNC: 111 MG/DL
UROBILINOGEN URINE: 0.2 MG/DL
WHITE BLOOD CELLS URINE: 0 /HPF

## 2023-08-07 ENCOUNTER — APPOINTMENT (OUTPATIENT)
Dept: NEPHROLOGY | Facility: CLINIC | Age: 78
End: 2023-08-07
Payer: MEDICARE

## 2023-08-07 DIAGNOSIS — N18.31 CHRONIC KIDNEY DISEASE, STAGE 3A: ICD-10-CM

## 2023-08-07 PROCEDURE — 99442: CPT

## 2023-08-07 NOTE — HISTORY OF PRESENT ILLNESS
[FreeTextEntry1] : 77 yo woman for f/u evaluation of CKD 3, microalbuminuria, HTN, DMT2. allergy to lisinopril A1C down to 7.5%-  started on jardiance 10 mg daily by Dr. Rod in June; continues on Mounjjaro- titrating upward as needed also started on baby ASA 81 mg daily for PAD- says her leg pain less; prior: trying to walk- vasc mild claudication- no intervention-  home BP running 120-130/70s at home; today, during visit: 121/71 No NSAIDs  Denies flank pain, dysuria, hematuria or frothy urine  No shortness of breath, no chest pain  NB: Off [facility]I after allergic rxn spring 2018

## 2023-08-07 NOTE — ASSESSMENT
[FreeTextEntry1] : all lab data was reviewed with patient in detail from 8/1/2023  79 yo woman  with CKD 3, microalbuminuria, HTN, uncontrolled DMT2 overweight -HTN- BP at goal; c/w present regimen encouraged weight loss and exercise - CKD 3- creat stable 1.17;  eGFR 45-50 range c/w low protein diet, avoiding NSAIDs-  --microalbuminuria-- stable 247- stable range- as above recently  started on Jardiance- good NB: angioedema with lisinopril-  avoid RASi  - DMT2-:  A1C improving 7.5%- c/w present regimen- other adjustments per Dr. Dunn -hyperlipidemia-   LP at goal- c/w atorvastatin   f/u OV  6 months with new labs prior

## 2023-08-07 NOTE — REASON FOR VISIT
[Follow-Up] : a follow-up visit [Home] : at home, [unfilled] , at the time of the visit. [Verbal consent obtained from patient] : the patient, [unfilled] [FreeTextEntry1] : for CKD 3 HTN, microalbuminuria

## 2023-08-09 ENCOUNTER — APPOINTMENT (OUTPATIENT)
Dept: INTERNAL MEDICINE | Facility: CLINIC | Age: 78
End: 2023-08-09
Payer: MEDICARE

## 2023-08-09 VITALS
BODY MASS INDEX: 28.71 KG/M2 | OXYGEN SATURATION: 97 % | TEMPERATURE: 98 F | HEART RATE: 96 BPM | WEIGHT: 156 LBS | SYSTOLIC BLOOD PRESSURE: 132 MMHG | DIASTOLIC BLOOD PRESSURE: 80 MMHG | HEIGHT: 62 IN

## 2023-08-09 DIAGNOSIS — R13.10 DYSPHAGIA, UNSPECIFIED: ICD-10-CM

## 2023-08-09 PROCEDURE — 99214 OFFICE O/P EST MOD 30 MIN: CPT

## 2023-08-09 NOTE — PHYSICAL EXAM
[No Acute Distress] : no acute distress [Normal Sclera/Conjunctiva] : normal sclera/conjunctiva [Normal Outer Ear/Nose] : the outer ears and nose were normal in appearance [No JVD] : no jugular venous distention [Supple] : supple [No Respiratory Distress] : no respiratory distress  [No Accessory Muscle Use] : no accessory muscle use [Clear to Auscultation] : lungs were clear to auscultation bilaterally [Normal Rate] : normal rate  [Regular Rhythm] : with a regular rhythm [Normal S1, S2] : normal S1 and S2 [No Edema] : there was no peripheral edema [Grossly Normal Strength/Tone] : grossly normal strength/tone [Coordination Grossly Intact] : coordination grossly intact [No Focal Deficits] : no focal deficits [Normal Gait] : normal gait [Normal Affect] : the affect was normal [Alert and Oriented x3] : oriented to person, place, and time [Normal Insight/Judgement] : insight and judgment were intact

## 2023-08-09 NOTE — ASSESSMENT
[FreeTextEntry1] : 76 yo female with hx DM2, HTN, CKD here for f/u.  1) HTN- chronic, stable/well controlled on current regimen, reivewed labs, cr and electrolytes acceptable, reviewed renal note. 2) DM2 - chronic, stable/improved but not quite at goal.  reviewed labs and endo note, a1c 7.5, fu endo and encouraged healthy balanced diet. 3) CKD - chronic, stable, reviewed renal note,  counseled importance of glucose and BP control 4) dysphagia - likely 2/2 acid reflux as she eats spicy foods, has appt with GI upcoming.   counseled avoid triggers.

## 2023-08-09 NOTE — HISTORY OF PRESENT ILLNESS
[de-identified] : 78 yo female with hx DM2, HTN, CKD here for f/u.  Reports compliant with meds, taking jardiance samples from endo, mounjaro, and insulin. eating healthier compliant with BP medications she has upcoming appt wiht GI for acid reflux symptoms.

## 2023-08-09 NOTE — HEALTH RISK ASSESSMENT
[0] : 2) Feeling down, depressed, or hopeless: Not at all (0) [PHQ-2 Negative - No further assessment needed] : PHQ-2 Negative - No further assessment needed [TGB0Cripm] : 0 [Never] : Never

## 2023-08-09 NOTE — REVIEW OF SYSTEMS
[Chest Pain] : no chest pain [Shortness Of Breath] : no shortness of breath [Negative] : Respiratory

## 2023-08-29 ENCOUNTER — RX RENEWAL (OUTPATIENT)
Age: 78
End: 2023-08-29

## 2023-10-04 ENCOUNTER — APPOINTMENT (OUTPATIENT)
Dept: GASTROENTEROLOGY | Facility: CLINIC | Age: 78
End: 2023-10-04
Payer: MEDICARE

## 2023-10-04 VITALS
WEIGHT: 154 LBS | SYSTOLIC BLOOD PRESSURE: 136 MMHG | HEIGHT: 62 IN | TEMPERATURE: 97.2 F | RESPIRATION RATE: 16 BRPM | HEART RATE: 108 BPM | OXYGEN SATURATION: 98 % | BODY MASS INDEX: 28.34 KG/M2 | DIASTOLIC BLOOD PRESSURE: 82 MMHG

## 2023-10-04 DIAGNOSIS — Z87.898 PERSONAL HISTORY OF OTHER SPECIFIED CONDITIONS: ICD-10-CM

## 2023-10-04 PROCEDURE — 99214 OFFICE O/P EST MOD 30 MIN: CPT

## 2023-10-05 LAB
ALBUMIN SERPL ELPH-MCNC: 4.7 G/DL
ALP BLD-CCNC: 103 U/L
ALT SERPL-CCNC: 13 U/L
ANION GAP SERPL CALC-SCNC: 14 MMOL/L
AST SERPL-CCNC: 13 U/L
BILIRUB SERPL-MCNC: 0.5 MG/DL
BUN SERPL-MCNC: 19 MG/DL
CALCIUM SERPL-MCNC: 10.1 MG/DL
CHLORIDE SERPL-SCNC: 101 MMOL/L
CO2 SERPL-SCNC: 26 MMOL/L
CREAT SERPL-MCNC: 1.13 MG/DL
EGFR: 50 ML/MIN/1.73M2
GLUCOSE SERPL-MCNC: 186 MG/DL
HCT VFR BLD CALC: 42.1 %
HGB BLD-MCNC: 14.1 G/DL
MCHC RBC-ENTMCNC: 28.5 PG
MCHC RBC-ENTMCNC: 33.5 GM/DL
MCV RBC AUTO: 85.1 FL
PLATELET # BLD AUTO: 322 K/UL
POTASSIUM SERPL-SCNC: 3.7 MMOL/L
PROT SERPL-MCNC: 7.2 G/DL
RBC # BLD: 4.95 M/UL
RBC # FLD: 13.6 %
SODIUM SERPL-SCNC: 141 MMOL/L
WBC # FLD AUTO: 7.93 K/UL

## 2023-10-27 ENCOUNTER — APPOINTMENT (OUTPATIENT)
Dept: ENDOCRINOLOGY | Facility: CLINIC | Age: 78
End: 2023-10-27
Payer: MEDICARE

## 2023-10-27 VITALS
SYSTOLIC BLOOD PRESSURE: 134 MMHG | WEIGHT: 151 LBS | DIASTOLIC BLOOD PRESSURE: 76 MMHG | BODY MASS INDEX: 27.62 KG/M2 | HEART RATE: 98 BPM

## 2023-10-27 LAB
GLUCOSE BLDC GLUCOMTR-MCNC: 132
HBA1C MFR BLD HPLC: 7.9

## 2023-10-27 PROCEDURE — 99214 OFFICE O/P EST MOD 30 MIN: CPT | Mod: 25

## 2023-10-27 PROCEDURE — 82962 GLUCOSE BLOOD TEST: CPT

## 2023-10-27 PROCEDURE — 83036 HEMOGLOBIN GLYCOSYLATED A1C: CPT | Mod: QW

## 2023-10-27 RX ORDER — INSULIN LISPRO 100 [IU]/ML
100 INJECTION, SOLUTION INTRAVENOUS; SUBCUTANEOUS
Qty: 3 | Refills: 3 | Status: DISCONTINUED | COMMUNITY
Start: 2017-03-08 | End: 2023-10-27

## 2023-11-07 ENCOUNTER — RESULT REVIEW (OUTPATIENT)
Age: 78
End: 2023-11-07

## 2023-11-07 ENCOUNTER — OUTPATIENT (OUTPATIENT)
Dept: OUTPATIENT SERVICES | Facility: HOSPITAL | Age: 78
LOS: 1 days | Discharge: HOME | End: 2023-11-07
Payer: MEDICARE

## 2023-11-07 ENCOUNTER — APPOINTMENT (OUTPATIENT)
Dept: GASTROENTEROLOGY | Facility: HOSPITAL | Age: 78
End: 2023-11-07

## 2023-11-07 LAB
GLUCOSE BLDC GLUCOMTR-MCNC: 168 MG/DL — HIGH (ref 70–99)
GLUCOSE BLDC GLUCOMTR-MCNC: 168 MG/DL — HIGH (ref 70–99)

## 2023-11-07 PROCEDURE — 43239 EGD BIOPSY SINGLE/MULTIPLE: CPT

## 2023-11-07 PROCEDURE — 82962 GLUCOSE BLOOD TEST: CPT

## 2023-11-07 PROCEDURE — 88305 TISSUE EXAM BY PATHOLOGIST: CPT

## 2023-11-07 PROCEDURE — 88305 TISSUE EXAM BY PATHOLOGIST: CPT | Mod: 26

## 2023-11-07 RX ORDER — ESOMEPRAZOLE MAGNESIUM 40 MG/1
40 CAPSULE, DELAYED RELEASE ORAL
Qty: 30 | Refills: 3 | Status: ACTIVE | COMMUNITY
Start: 2023-11-07 | End: 1900-01-01

## 2023-11-07 NOTE — PRE-ANESTHESIA EVALUATION ADULT - NSANTHGENDERRD_ENT_A_CORE
Responded emergently to a CODE BLUE.    Patient has a history of pulmonary embolism treated with low dose Lovenox secondary to a subarachnoid hemorrhage it occurred after she fell one day after being on full dose Lovenox.  Patient also has a history of central apnea.  Upon arrival to the room the patient was cyanotic with shallow decreased respirations and hypoxia.  I immediately placed on a bag valve mask with high flow nasal cannula oxygen and her saturation improved to 100%.  Her mentation improved with GCS of 15.  No CPR was initiated.  Accu-Chek was within normal limits.  EKG showed no signs of acute dysrhythmia or STEMI.  Abdomen was soft nontender nondistended.  Lung sounds were clear after oxygen level 100%.  From this point forward, her hospitalist, Dr. Devine managed her care.    Review of systems as above    Physical exam  On arrival patient was lethargic somnolent and almost apneic with cyanosis.    Cardiovascular-regular rate, no murmurs or gallops  Respiratory-apnea shallow breath sounds but no wheezes rales or rhonchi  Abdomen-soft nontender nondistended with multiple ecchymosis from Lovenox injections  Extremities -moves upper extremity's well and has a bandage on the left foot with SCDs on both legs  Neuro- GCS 9 on arrival in room and 15 after improved oxygenation    Assessment hypoxia causing altered mental status.  Hypoxia unclear etiology.  Could be due to central apnea.  Patient was not on any opiates or benzodiazepines.  Patient was not hypoglycemic.  We found out that the patient did have a short course of V. tach which could've caused loss of consciousness and hypoxia.  Hospitalist will get a cardiology consult and transfer to ICU.  
No

## 2023-11-09 LAB
SURGICAL PATHOLOGY STUDY: SIGNIFICANT CHANGE UP
SURGICAL PATHOLOGY STUDY: SIGNIFICANT CHANGE UP

## 2023-11-10 DIAGNOSIS — K29.70 GASTRITIS, UNSPECIFIED, WITHOUT BLEEDING: ICD-10-CM

## 2023-11-10 DIAGNOSIS — R13.19 OTHER DYSPHAGIA: ICD-10-CM

## 2023-11-10 DIAGNOSIS — K22.10 ULCER OF ESOPHAGUS WITHOUT BLEEDING: ICD-10-CM

## 2023-11-10 DIAGNOSIS — Z88.8 ALLERGY STATUS TO OTHER DRUGS, MEDICAMENTS AND BIOLOGICAL SUBSTANCES: ICD-10-CM

## 2023-11-30 ENCOUNTER — APPOINTMENT (OUTPATIENT)
Dept: GASTROENTEROLOGY | Facility: CLINIC | Age: 78
End: 2023-11-30
Payer: MEDICARE

## 2023-11-30 VITALS
WEIGHT: 150 LBS | SYSTOLIC BLOOD PRESSURE: 122 MMHG | TEMPERATURE: 96.9 F | HEIGHT: 62 IN | HEART RATE: 99 BPM | RESPIRATION RATE: 15 BRPM | DIASTOLIC BLOOD PRESSURE: 75 MMHG | BODY MASS INDEX: 27.6 KG/M2 | OXYGEN SATURATION: 98 %

## 2023-11-30 DIAGNOSIS — K22.10 ULCER OF ESOPHAGUS W/OUT BLEEDING: ICD-10-CM

## 2023-11-30 PROCEDURE — 99214 OFFICE O/P EST MOD 30 MIN: CPT

## 2023-11-30 RX ORDER — FAMOTIDINE 20 MG/1
20 TABLET, FILM COATED ORAL
Qty: 180 | Refills: 0 | Status: DISCONTINUED | COMMUNITY
Start: 2020-07-14 | End: 2023-11-30

## 2023-12-04 PROBLEM — K22.10 ESOPHAGEAL ULCER: Status: ACTIVE | Noted: 2023-11-07

## 2023-12-13 ENCOUNTER — APPOINTMENT (OUTPATIENT)
Dept: INTERNAL MEDICINE | Facility: CLINIC | Age: 78
End: 2023-12-13
Payer: MEDICARE

## 2023-12-13 VITALS
HEIGHT: 62 IN | SYSTOLIC BLOOD PRESSURE: 135 MMHG | HEART RATE: 75 BPM | BODY MASS INDEX: 27.23 KG/M2 | OXYGEN SATURATION: 99 % | TEMPERATURE: 98 F | WEIGHT: 148 LBS | DIASTOLIC BLOOD PRESSURE: 84 MMHG

## 2023-12-13 PROCEDURE — G0008: CPT

## 2023-12-13 PROCEDURE — 99214 OFFICE O/P EST MOD 30 MIN: CPT | Mod: 25

## 2023-12-13 PROCEDURE — 90662 IIV NO PRSV INCREASED AG IM: CPT

## 2023-12-18 ENCOUNTER — APPOINTMENT (OUTPATIENT)
Dept: OPHTHALMOLOGY | Facility: CLINIC | Age: 78
End: 2023-12-18

## 2024-01-17 ENCOUNTER — APPOINTMENT (OUTPATIENT)
Dept: OPHTHALMOLOGY | Facility: CLINIC | Age: 79
End: 2024-01-17
Payer: MEDICARE

## 2024-01-17 ENCOUNTER — NON-APPOINTMENT (OUTPATIENT)
Age: 79
End: 2024-01-17

## 2024-01-17 PROCEDURE — 92012 INTRM OPH EXAM EST PATIENT: CPT

## 2024-01-17 PROCEDURE — 92134 CPTRZ OPH DX IMG PST SGM RTA: CPT

## 2024-01-17 PROCEDURE — 92083 EXTENDED VISUAL FIELD XM: CPT

## 2024-01-23 ENCOUNTER — APPOINTMENT (OUTPATIENT)
Dept: HEART AND VASCULAR | Facility: CLINIC | Age: 79
End: 2024-01-23

## 2024-01-26 ENCOUNTER — APPOINTMENT (OUTPATIENT)
Dept: HEART AND VASCULAR | Facility: CLINIC | Age: 79
End: 2024-01-26
Payer: MEDICARE

## 2024-01-26 VITALS
HEIGHT: 62 IN | BODY MASS INDEX: 26.31 KG/M2 | DIASTOLIC BLOOD PRESSURE: 79 MMHG | TEMPERATURE: 97.2 F | SYSTOLIC BLOOD PRESSURE: 136 MMHG | WEIGHT: 143 LBS | HEART RATE: 101 BPM | OXYGEN SATURATION: 97 %

## 2024-01-26 DIAGNOSIS — I73.9 PERIPHERAL VASCULAR DISEASE, UNSPECIFIED: ICD-10-CM

## 2024-01-26 DIAGNOSIS — M79.669 PAIN IN UNSPECIFIED LOWER LEG: ICD-10-CM

## 2024-01-26 PROCEDURE — 93923 UPR/LXTR ART STDY 3+ LVLS: CPT

## 2024-01-26 PROCEDURE — 99214 OFFICE O/P EST MOD 30 MIN: CPT

## 2024-02-01 LAB
ALBUMIN SERPL ELPH-MCNC: 4.7 G/DL
ANION GAP SERPL CALC-SCNC: 12 MMOL/L
APPEARANCE: ABNORMAL
BACTERIA: NEGATIVE /HPF
BASOPHILS # BLD AUTO: 0.09 K/UL
BASOPHILS NFR BLD AUTO: 1 %
BILIRUBIN URINE: NEGATIVE
BLOOD URINE: NEGATIVE
BUN SERPL-MCNC: 25 MG/DL
CALCIUM SERPL-MCNC: 10.5 MG/DL
CAST: 1 /LPF
CHLORIDE SERPL-SCNC: 97 MMOL/L
CO2 SERPL-SCNC: 30 MMOL/L
COLOR: YELLOW
CREAT SERPL-MCNC: 1.21 MG/DL
CREAT SPEC-SCNC: 51 MG/DL
EGFR: 46 ML/MIN/1.73M2
EOSINOPHIL # BLD AUTO: 0.15 K/UL
EOSINOPHIL NFR BLD AUTO: 1.7 %
EPITHELIAL CELLS: 4 /HPF
ESTIMATED AVERAGE GLUCOSE: 212 MG/DL
GLUCOSE QUALITATIVE U: >=1000 MG/DL
GLUCOSE SERPL-MCNC: 249 MG/DL
HBA1C MFR BLD HPLC: 9 %
HCT VFR BLD CALC: 47.8 %
HGB BLD-MCNC: 15.2 G/DL
IMM GRANULOCYTES NFR BLD AUTO: 0.3 %
KETONES URINE: NEGATIVE MG/DL
LEUKOCYTE ESTERASE URINE: NEGATIVE
LYMPHOCYTES # BLD AUTO: 3.27 K/UL
LYMPHOCYTES NFR BLD AUTO: 38.1 %
MAN DIFF?: NORMAL
MCHC RBC-ENTMCNC: 28.3 PG
MCHC RBC-ENTMCNC: 31.8 GM/DL
MCV RBC AUTO: 88.8 FL
MICROALBUMIN 24H UR DL<=1MG/L-MCNC: 10.6 MG/DL
MICROALBUMIN/CREAT 24H UR-RTO: 208 MG/G
MICROSCOPIC-UA: NORMAL
MONOCYTES # BLD AUTO: 0.59 K/UL
MONOCYTES NFR BLD AUTO: 6.9 %
NEUTROPHILS # BLD AUTO: 4.45 K/UL
NEUTROPHILS NFR BLD AUTO: 52 %
NITRITE URINE: NEGATIVE
PH URINE: 6
PHOSPHATE SERPL-MCNC: 3.4 MG/DL
PLATELET # BLD AUTO: 378 K/UL
POTASSIUM SERPL-SCNC: 4.3 MMOL/L
PROTEIN URINE: NORMAL MG/DL
RBC # BLD: 5.38 M/UL
RBC # FLD: 14.3 %
RED BLOOD CELLS URINE: 18 /HPF
REVIEW: NORMAL
SODIUM SERPL-SCNC: 139 MMOL/L
SPECIFIC GRAVITY URINE: 1.03
UROBILINOGEN URINE: 0.2 MG/DL
WBC # FLD AUTO: 8.58 K/UL
WHITE BLOOD CELLS URINE: 4 /HPF

## 2024-02-05 ENCOUNTER — LABORATORY RESULT (OUTPATIENT)
Age: 79
End: 2024-02-05

## 2024-02-05 ENCOUNTER — APPOINTMENT (OUTPATIENT)
Dept: NEPHROLOGY | Facility: CLINIC | Age: 79
End: 2024-02-05
Payer: COMMERCIAL

## 2024-02-05 VITALS
RESPIRATION RATE: 14 BRPM | SYSTOLIC BLOOD PRESSURE: 122 MMHG | DIASTOLIC BLOOD PRESSURE: 80 MMHG | HEIGHT: 62 IN | HEART RATE: 90 BPM

## 2024-02-05 DIAGNOSIS — R80.9 PROTEINURIA, UNSPECIFIED: ICD-10-CM

## 2024-02-05 PROCEDURE — 99214 OFFICE O/P EST MOD 30 MIN: CPT

## 2024-02-05 PROCEDURE — G2211 COMPLEX E/M VISIT ADD ON: CPT

## 2024-02-05 NOTE — PHYSICAL EXAM
[General Appearance - Alert] : alert [General Appearance - In No Acute Distress] : in no acute distress [Sclera] : the sclera and conjunctiva were normal [Extraocular Movements] : extraocular movements were intact [] : no respiratory distress [Auscultation Breath Sounds / Voice Sounds] : lungs were clear to auscultation bilaterally [Heart Rate And Rhythm] : heart rate was normal and rhythm regular [Heart Sounds] : normal S1 and S2 [Heart Sounds Gallop] : no gallops [Murmurs] : no murmurs [Heart Sounds Pericardial Friction Rub] : no pericardial rub [Full Pulse] : the pedal pulses are present [Edema] : there was no peripheral edema [No CVA Tenderness] : no ~M costovertebral angle tenderness [No Spinal Tenderness] : no spinal tenderness [Oriented To Time, Place, And Person] : oriented to person, place, and time [Impaired Insight] : insight and judgment were intact [Affect] : the affect was normal

## 2024-02-06 NOTE — HISTORY OF PRESENT ILLNESS
[FreeTextEntry1] : 79 yo woman with CKD 3, microalbuminuria, HTN, DMT2, for follow up evaluation. Pt. states about 1 a month she felt "funny" similar to a nervousness but denies lightheadedness or dizziness.  She thinks this is related to her blood sugar and will improve after she eats. occurs intermittently continues on Jardiance and mounjaro No NSAIDs or PPIs  Denies flank pain, dysuria, hematuria or frothy urine   NB: Off CEI after allergic reaction spring 2018

## 2024-02-06 NOTE — ASSESSMENT
[FreeTextEntry1] : all lab data was reviewed with patient in detail from 1/31/2024  79 yo woman with CKD 3, microalbuminuria, HTN, DMT2, new microscopic hematuria # microhematuria- nwq- repeat u/a with Uculture today. If persists will need cystoscopy and CT scan #albuminuria- Ualb/creat 208- downtrend- may need to increase jardiance- will trend #CKD 3a creat stable 1.21- eGFR 40s #HTN BP acceptable #DM- a1c up to 9%  glucose on lab 249- to review with Dr. Dunn - Cont. current regimen of Jardiance, amlodipine, carvedilol, and chlorthalidone  will notify her of results RTC in 4-6 months

## 2024-02-19 PROBLEM — M79.669 CALF PAIN: Status: ACTIVE | Noted: 2017-10-11

## 2024-02-19 PROBLEM — I73.9 CLAUDICATION, INTERMITTENT: Status: ACTIVE | Noted: 2021-11-30

## 2024-02-19 PROBLEM — I73.9 PAD (PERIPHERAL ARTERY DISEASE): Status: ACTIVE | Noted: 2023-06-06

## 2024-02-20 ENCOUNTER — APPOINTMENT (OUTPATIENT)
Dept: ENDOCRINOLOGY | Facility: CLINIC | Age: 79
End: 2024-02-20
Payer: MEDICARE

## 2024-02-20 VITALS
SYSTOLIC BLOOD PRESSURE: 143 MMHG | BODY MASS INDEX: 25.61 KG/M2 | HEART RATE: 109 BPM | DIASTOLIC BLOOD PRESSURE: 77 MMHG | WEIGHT: 140 LBS

## 2024-02-20 LAB
GLUCOSE BLDC GLUCOMTR-MCNC: 182
HBA1C MFR BLD HPLC: 9

## 2024-02-20 PROCEDURE — 82962 GLUCOSE BLOOD TEST: CPT

## 2024-02-20 PROCEDURE — 83036 HEMOGLOBIN GLYCOSYLATED A1C: CPT | Mod: QW

## 2024-02-20 PROCEDURE — 99214 OFFICE O/P EST MOD 30 MIN: CPT | Mod: 25

## 2024-02-20 NOTE — HISTORY OF PRESENT ILLNESS
[FreeTextEntry1] : weight is down another 13 lb since last visit in October. She is eating less, snacking less and usually does not eat lunch. She is not taking Humalog for all meals, or even for every dinner meal (which is larger than breakfast).  if sugar is below 180, she does not take Humalog at night.  She does tend to eat late at night. morning sugars typically in the 180s range. She doesn't exercise but walks for her shopping , but she doesn't go shopping every day. she is up to date with optho, went last month:  mild-mod non proliferative DR.   A1c is 9.0% today. (same last month also) labs reviewed from 1/24:  urine alb/cr 208, GFR 46  Meds Humalog 6  units for dinner if glucose is over 180, taking about 3xweek Mounjaro 15mg/week metformin 1g bid  chlorthalidone 25mg atorvastatin 20mg vitamin D pantoprazole Previous meds: lisinopril (lip swelling), Victoza (too expensive), bupropion (nausea), Trulicity (changed to Ozempic)

## 2024-02-20 NOTE — PHYSICAL EXAM
[Alert] : alert [Healthy Appearance] : healthy appearance [No Stigmata of Cushings Syndrome] : no stigmata of Cushings Syndrome [Normal Affect] : the affect was normal [Normal Mood] : the mood was normal [de-identified] : glucose 132, pre lunch (boiled egg, tomato juice) [de-identified] : mild acanthosis nigricans

## 2024-02-20 NOTE — DATA REVIEWED
[FreeTextEntry1] : 2/24  A1c 9.0% 1/24  A1c 9.0%, urine alb/cr 208, GFR 46 10/23  A1c 7.9% POC 8/23  A1c 7.5%,  urine alb/cr 247, tot chol 167, trig 111, HDL 61, LDL 86, GFR 48 6/23  A1c 7.8% 3/23  A1c 8.1% POC, urine alb/cr 211 10/22 A1c 7.3% POC 9/22 A1c 8.0%, tot chol 151, trig 180, HDL 54, LDL 61, urine alb/cr 185, 25D 50.7 7/22  A1c 7.4%, POC 3/22: A1c 8.7% POC, 9.0% urine alb/cr 335, 25D 47.1, Ca 10.5, PTH 34 11/21: A1c 8.8%, tot chol 146, trig 134, HDL 56, LDL 63, urine microalbumin/cr 153 7/21: A1c 7.8% 3/21: uine microalbumin/cr 127 1/21: A1c 7.7% 8/20: A1c 7.3%, urine microalbumin/cr 111, 25D 47.9 6/20: A1c 7.5%, TSH 1.72, Covid Ab (+), tot chol 101, trig 142, HDL 59, LDL 63 11/19: A1c 8.5%, urine microalbumin/cr 218 2/20: A1C 8.3% 11/19: A1c 8.5% 7/19: A1c 8.5%, Cr 1.33, GFR 46 3/19: A1c 8.6%, tot chol 133, trig 87, HDL 59, LDL 57, TSH 2.65, Cr 1.33, urine microalbumin/cr 146 11/18: A1c 8.4%, tot chol 140, trig 85, HDL 59, LDL 64, urine microalbumin /cr 156 9/18: A1c 8.6%, tot chol 150, trig 92, HDL 56, LDL 76 7/18: A1c 8.5% point of care 4/18: A1c 8.8%, Cr 1.43, tot chol 214, trig 113, HDL 60,  10/17: A1c 7.8%, TSH 1.67 6/17: A1c 8.5%, urine microalb/cr 18 2/17: tot chol 122, trig 68, HDL 62, LDL 46, Cr 1.30, eGFR 42 12/16: A1c 8.3%, tot chol 196, trig 91, HDL 54,  9/16: A1c 8.1% 7/16: A1c 8.5%,  urine microalb/cr 96, TSH 2.52  bone density 2/17, Hologic:  L1-L4  1.060 T -0.8 (L1 T -1.2) tot hip 1.014, T -0.1 fem neck 0.972, T 0.1

## 2024-02-20 NOTE — ASSESSMENT
[FreeTextEntry1] : Diabetes with multiple complications, microalbuminuria on ACEI.   Continue Mounjaro 15mg/week,  Jardiance 10mg/day and metformin 1g bid. Advised pt to take 6 units Humalog for every dinner meal, and try to eat earlier.   She is probably having night time hyperglycemia that is lasting all night (8 hours) which is skewing her A1c higher. continue statin therapy try to take a walk every day RTO 4 months

## 2024-03-15 ENCOUNTER — APPOINTMENT (OUTPATIENT)
Dept: GASTROENTEROLOGY | Facility: CLINIC | Age: 79
End: 2024-03-15
Payer: MEDICARE

## 2024-03-15 DIAGNOSIS — R13.10 DYSPHAGIA, UNSPECIFIED: ICD-10-CM

## 2024-03-15 PROCEDURE — 99442: CPT

## 2024-03-29 PROBLEM — R13.10 ODYNOPHAGIA: Status: ACTIVE | Noted: 2023-04-12

## 2024-03-29 NOTE — HISTORY OF PRESENT ILLNESS
[Home] : at home, [unfilled] , at the time of the visit. 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 Rivers for colon cancer screening in 2022, now here for odynophagia which resolved on PPI since EGD.  Pt reports she's feeling well - denies any complaints at this time and grateful for PPI at this time.   EGD:Impressions:  Tortuosity of the esophageal lumen was noted, consistent with presbyesophagus. 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 . Normal mucosa in the whole stomach. Normal mucosa in the whole examined duodenum. Plan: Await pathology results. Omeprazole 40 mg po daily (OK to HOLD famotidine while on this treatment) PATH:  Final Diagnosis 1. GE junction, biopsy: - Junctional squamo-glandular mucosa with inflammation, negative for intestinal metaplasia and dysplasia   HPI: She describes pain with swallowing for 3 years that has become progressively worse. Pain is immediately after eating, burning, and non radiating. She denies dysphagia or sensation of food getting stuck. Unclear if related to certain foods- although appears worse with spicy foods and solids foods, but not entirely consistent. Feels this is different than her heart burn symptoms. Has been taking famotidine 20mg BID which helps heart burn but does not appear related to swallowing pain.   No unintentional wt loss, melena, anemia, family hx, former smoker quit >40 years ago.  Reviewed medications; no use of bisphosphonates, inhalers/ steroids, levothyroxine, nsaids. Swallows pills with water.  Prior visit:  CSCOPE 11/15/22:   Findings:    Mucosa Lipomatous IC valve was noted    Protruding lesions A single sessile 3 mm polyp was found in the cecum.A  single-piece polypectomy was performed using a cold forceps. The polyp was  completely removed.    A single sessile 4 mm polyp was found in the transverse colon.A single-piece  polypectomy was performed using a cold forceps. The polyp was completely  removed.    Additional findings The colon was otherwise unremarkable.    Impressions:    Polyp (3 mm) in the cecum. (Polypectomy).    Polyp (4 mm) in the transverse colon. (Polypectomy).    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    The colon was otherwise unremarkable.    Plan:    Await pathology results.    Discharge to home    Patient will call office if any worrisome complaints, anticipatory guidance  discussed    Resume Previous Diet    Will consider surveillance colonoscopy for CRC screening in 7-10 yrs depending  on the QOL and patient preference   PATH: 1. Cecum, polyp; polypectomy: - Colonic mucosa with lymphoid aggregate.    2. Colon, transverse, polyp; polypectomy: - Hyperplastic polyp.  Reports she is generally feeling well. Reports heartburn symptoms are on occasion and takes famotidine prn. + thick clear phlegm. No red flag sx.  No odynophagia or dysphagia.  Not following with dietician. Denies fever, chill, cp, sob, abd pain, nausea, vomiting, diarrhea, constipation, hematochezia, flatulence, belching, unintentional wt loss or loss of appetite  Pt reports BM BID formed, no blood, no mucus.  Admits to complete evacuation but does notice "smearing" on toilet paper even if she didn't have a BM. Having soft stools.   She is compliant with her medications including ozempic, metformin and insulin.    C-scope 2017: ascending colon 3-5 mm polyp, probable lipoma in ascending colon. PATH: TA

## 2024-03-29 NOTE — REASON FOR VISIT
[Initial Evaluation] : an initial evaluation [Follow-Up: _____] : a [unfilled] follow-up visit [FreeTextEntry1] : colon cancer screening, fecal incontinence

## 2024-03-29 NOTE — ASSESSMENT
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 Rivers for colon cancer screening in 2022, now here for odynophagia, s/p EGD showing esphageal ulcer, biopsy benign. PPI with relief.   #Odynophagia  **Now reports resolution on PPI - cont PPI for now at 40mg; pt declines to drop dose at this time; counseled on r/b  - Diet and lifestyle modifications (avoid spicy foods, greasy foods, tomato based foods, caffeine, chocolate, alcohol, aviod late night eating and sit upright for at least 2-3 hrs after eating) - ok to remain off of famotidine while on PPI; can switch to H2 in future when pt ready  - of note off of aspirin for 2 mo on her own, unclear why; pt cleared to restart from GI perspective  - if recurs on PPI, plan for esophagram to evaluate if she needs dilation   #CRC screening Hx tubular adenoma in 2017 - repeat CSCOPE 11/2022 showed 2 benign polyps < 10mm - consider surveillance cscope pending on QOL and patient preference given age in 7-10 years - reach out sooner if any worrisome symptoms including but not limited to unintentional weight loss, abd pain, change in bowel habits, blood in stool  F/U as needed

## 2024-03-29 NOTE — CONSULT LETTER
[Dear  ___] : Dear  [unfilled], [( Thank you for referring [unfilled] for consultation for _____ )] : Thank you for referring [unfilled] for consultation for [unfilled] [Please see my note below.] : Please see my note below. [Sincerely,] : Sincerely, [FreeTextEntry3] : Nia Wilcox NP  [DrValarie  ___] : Dr. SHULTZ

## 2024-04-03 RX ORDER — AMLODIPINE BESYLATE 10 MG/1
10 TABLET ORAL
Qty: 90 | Refills: 1 | Status: ACTIVE | COMMUNITY
Start: 2018-05-08 | End: 1900-01-01

## 2024-04-03 RX ORDER — SEMAGLUTIDE 2.68 MG/ML
8 INJECTION, SOLUTION SUBCUTANEOUS
Qty: 3 | Refills: 0 | Status: ACTIVE | COMMUNITY
Start: 2024-04-03 | End: 1900-01-01

## 2024-04-09 RX ORDER — CARVEDILOL 3.12 MG/1
3.12 TABLET, FILM COATED ORAL
Qty: 180 | Refills: 1 | Status: ACTIVE | COMMUNITY
Start: 2021-01-27 | End: 1900-01-01

## 2024-06-05 ENCOUNTER — APPOINTMENT (OUTPATIENT)
Dept: OPHTHALMOLOGY | Facility: CLINIC | Age: 79
End: 2024-06-05
Payer: MEDICARE

## 2024-06-05 ENCOUNTER — NON-APPOINTMENT (OUTPATIENT)
Age: 79
End: 2024-06-05

## 2024-06-05 PROCEDURE — 92012 INTRM OPH EXAM EST PATIENT: CPT

## 2024-06-05 PROCEDURE — 92083 EXTENDED VISUAL FIELD XM: CPT

## 2024-06-05 PROCEDURE — 92133 CPTRZD OPH DX IMG PST SGM ON: CPT

## 2024-06-19 ENCOUNTER — APPOINTMENT (OUTPATIENT)
Dept: ENDOCRINOLOGY | Facility: CLINIC | Age: 79
End: 2024-06-19
Payer: MEDICARE

## 2024-06-19 VITALS
DIASTOLIC BLOOD PRESSURE: 85 MMHG | WEIGHT: 133 LBS | SYSTOLIC BLOOD PRESSURE: 146 MMHG | HEART RATE: 94 BPM | BODY MASS INDEX: 24.33 KG/M2

## 2024-06-19 LAB — HBA1C MFR BLD HPLC: 8.7

## 2024-06-19 PROCEDURE — 83036 HEMOGLOBIN GLYCOSYLATED A1C: CPT | Mod: QW

## 2024-06-19 PROCEDURE — G2211 COMPLEX E/M VISIT ADD ON: CPT

## 2024-06-19 PROCEDURE — 99214 OFFICE O/P EST MOD 30 MIN: CPT

## 2024-06-19 RX ORDER — EMPAGLIFLOZIN 10 MG/1
10 TABLET, FILM COATED ORAL
Qty: 90 | Refills: 3 | Status: ACTIVE | COMMUNITY
Start: 2023-10-27 | End: 1900-01-01

## 2024-06-19 RX ORDER — TIRZEPATIDE 15 MG/.5ML
15 INJECTION, SOLUTION SUBCUTANEOUS
Qty: 12 | Refills: 3 | Status: ACTIVE | COMMUNITY
Start: 2022-11-08 | End: 1900-01-01

## 2024-06-19 NOTE — PHYSICAL EXAM
[Alert] : alert [Healthy Appearance] : healthy appearance [No Stigmata of Cushings Syndrome] : no stigmata of Cushings Syndrome [Normal Affect] : the affect was normal [Normal Mood] : the mood was normal [de-identified] : glucose 132, pre lunch (boiled egg, tomato juice) [de-identified] : mild acanthosis nigricans

## 2024-06-19 NOTE — DATA REVIEWED
[FreeTextEntry1] : 6/24  A1c 8.7% 2/24  A1c 9.0% 1/24  A1c 9.0%, urine alb/cr 208, GFR 46 10/23  A1c 7.9% POC 8/23  A1c 7.5%,  urine alb/cr 247, tot chol 167, trig 111, HDL 61, LDL 86, GFR 48 6/23  A1c 7.8% 3/23  A1c 8.1% POC, urine alb/cr 211 10/22 A1c 7.3% POC 9/22 A1c 8.0%, tot chol 151, trig 180, HDL 54, LDL 61, urine alb/cr 185, 25D 50.7 7/22  A1c 7.4%, POC 3/22: A1c 8.7% POC, 9.0% urine alb/cr 335, 25D 47.1, Ca 10.5, PTH 34 11/21: A1c 8.8%, tot chol 146, trig 134, HDL 56, LDL 63, urine microalbumin/cr 153 7/21: A1c 7.8% 3/21: uine microalbumin/cr 127 1/21: A1c 7.7% 8/20: A1c 7.3%, urine microalbumin/cr 111, 25D 47.9 6/20: A1c 7.5%, TSH 1.72, Covid Ab (+), tot chol 101, trig 142, HDL 59, LDL 63 11/19: A1c 8.5%, urine microalbumin/cr 218 2/20: A1C 8.3% 11/19: A1c 8.5% 7/19: A1c 8.5%, Cr 1.33, GFR 46 3/19: A1c 8.6%, tot chol 133, trig 87, HDL 59, LDL 57, TSH 2.65, Cr 1.33, urine microalbumin/cr 146 11/18: A1c 8.4%, tot chol 140, trig 85, HDL 59, LDL 64, urine microalbumin /cr 156 9/18: A1c 8.6%, tot chol 150, trig 92, HDL 56, LDL 76 7/18: A1c 8.5% point of care 4/18: A1c 8.8%, Cr 1.43, tot chol 214, trig 113, HDL 60,  10/17: A1c 7.8%, TSH 1.67 6/17: A1c 8.5%, urine microalb/cr 18 2/17: tot chol 122, trig 68, HDL 62, LDL 46, Cr 1.30, eGFR 42 12/16: A1c 8.3%, tot chol 196, trig 91, HDL 54,  9/16: A1c 8.1% 7/16: A1c 8.5%,  urine microalb/cr 96, TSH 2.52  bone density 2/17, Hologic:  L1-L4  1.060 T -0.8 (L1 T -1.2) tot hip 1.014, T -0.1 fem neck 0.972, T 0.1

## 2024-06-19 NOTE — ASSESSMENT
[FreeTextEntry1] : Diabetes with multiple complications, microalbuminuria on ACEI.  Not controlled due to night eating. Resume Mounjaro 15mg/week,  if available (otherwise, continue Ozemic 2mg/week).   Continue Jardiance 10mg/day and metformin 1g bid. Advised pt to take 6 units Humalog for every dinner meal, and try to eat earlier.   She really needs to stop the night snacking, but I don't know how to do this.  I suggested distracting herself (take up a new activity) and avoid eating in her bedroom. I suggested giving herself an errand every day (to make her walk more) or walk in the park when she doesn't have an errand to run. continue statin therapy  RTO 4 months

## 2024-06-19 NOTE — HISTORY OF PRESENT ILLNESS
[FreeTextEntry1] : weight is down another 7 lb since last visit in October. She doesn't eat much during the day, has diner at 4p, but at night, she is still snacking at 11pm. She walks for her errands, about 4-5 blocks. One Touch Ultra2 meter:  14d avg 205 (n17), 30d avg 200 (n30) mornings:  172, 176, 217, 149, 170 pm: 213, 203, 178, 165, 279, 257 She hasn't been able to get Mounjaro and has been taking Ozempic 2mg/week. 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   A1c is 8.7% today. (almost the same as last time)  Meds Humalog 6  units for dinner, skips if below 130 Mounjaro 15mg/week or Ozempic 2mg/week metformin 1g bid  chlorthalidone 25mg atorvastatin 20mg vitamin D pantoprazole Previous meds: lisinopril (lip swelling), Victoza (too expensive), bupropion (nausea), Trulicity (changed to Ozempic)

## 2024-06-26 ENCOUNTER — APPOINTMENT (OUTPATIENT)
Dept: INTERNAL MEDICINE | Facility: CLINIC | Age: 79
End: 2024-06-26
Payer: MEDICARE

## 2024-06-26 VITALS
BODY MASS INDEX: 25.03 KG/M2 | DIASTOLIC BLOOD PRESSURE: 74 MMHG | TEMPERATURE: 97.2 F | HEIGHT: 62 IN | SYSTOLIC BLOOD PRESSURE: 131 MMHG | OXYGEN SATURATION: 100 % | WEIGHT: 136 LBS | HEART RATE: 94 BPM

## 2024-06-26 DIAGNOSIS — E11.9 TYPE 2 DIABETES MELLITUS W/OUT COMPLICATIONS: ICD-10-CM

## 2024-06-26 DIAGNOSIS — E78.5 HYPERLIPIDEMIA, UNSPECIFIED: ICD-10-CM

## 2024-06-26 DIAGNOSIS — N18.30 CHRONIC KIDNEY DISEASE, STAGE 3 UNSPECIFIED: ICD-10-CM

## 2024-06-26 DIAGNOSIS — I10 ESSENTIAL (PRIMARY) HYPERTENSION: ICD-10-CM

## 2024-06-26 DIAGNOSIS — R31.9 HEMATURIA, UNSPECIFIED: ICD-10-CM

## 2024-06-26 PROCEDURE — G2211 COMPLEX E/M VISIT ADD ON: CPT

## 2024-06-26 PROCEDURE — 36415 COLL VENOUS BLD VENIPUNCTURE: CPT

## 2024-06-26 PROCEDURE — 99214 OFFICE O/P EST MOD 30 MIN: CPT

## 2024-06-27 LAB
ANION GAP SERPL CALC-SCNC: 13 MMOL/L
BUN SERPL-MCNC: 22 MG/DL
CALCIUM SERPL-MCNC: 9.9 MG/DL
CHLORIDE SERPL-SCNC: 98 MMOL/L
CHOLEST SERPL-MCNC: 144 MG/DL
CO2 SERPL-SCNC: 28 MMOL/L
CREAT SERPL-MCNC: 1.24 MG/DL
EGFR: 45 ML/MIN/1.73M2
GLUCOSE SERPL-MCNC: 258 MG/DL
HDLC SERPL-MCNC: 56 MG/DL
LDLC SERPL CALC-MCNC: 68 MG/DL
NONHDLC SERPL-MCNC: 88 MG/DL
POTASSIUM SERPL-SCNC: 4.6 MMOL/L
SODIUM SERPL-SCNC: 139 MMOL/L
TRIGL SERPL-MCNC: 110 MG/DL

## 2024-07-18 ENCOUNTER — NON-APPOINTMENT (OUTPATIENT)
Age: 79
End: 2024-07-18

## 2024-07-18 ENCOUNTER — APPOINTMENT (OUTPATIENT)
Dept: OPHTHALMOLOGY | Facility: CLINIC | Age: 79
End: 2024-07-18
Payer: MEDICARE

## 2024-07-18 PROCEDURE — 92250 FUNDUS PHOTOGRAPHY W/I&R: CPT

## 2024-07-18 PROCEDURE — 92014 COMPRE OPH EXAM EST PT 1/>: CPT

## 2024-07-30 ENCOUNTER — APPOINTMENT (OUTPATIENT)
Dept: HEART AND VASCULAR | Facility: CLINIC | Age: 79
End: 2024-07-30
Payer: MEDICARE

## 2024-07-30 VITALS — DIASTOLIC BLOOD PRESSURE: 90 MMHG | SYSTOLIC BLOOD PRESSURE: 157 MMHG

## 2024-07-30 VITALS
OXYGEN SATURATION: 95 % | SYSTOLIC BLOOD PRESSURE: 153 MMHG | WEIGHT: 140 LBS | HEART RATE: 84 BPM | BODY MASS INDEX: 25.76 KG/M2 | HEIGHT: 62 IN | DIASTOLIC BLOOD PRESSURE: 82 MMHG

## 2024-07-30 DIAGNOSIS — E78.5 HYPERLIPIDEMIA, UNSPECIFIED: ICD-10-CM

## 2024-07-30 DIAGNOSIS — E11.9 TYPE 2 DIABETES MELLITUS W/OUT COMPLICATIONS: ICD-10-CM

## 2024-07-30 DIAGNOSIS — I73.9 PERIPHERAL VASCULAR DISEASE, UNSPECIFIED: ICD-10-CM

## 2024-07-30 DIAGNOSIS — I65.23 OCCLUSION AND STENOSIS OF BILATERAL CAROTID ARTERIES: ICD-10-CM

## 2024-07-30 DIAGNOSIS — I10 ESSENTIAL (PRIMARY) HYPERTENSION: ICD-10-CM

## 2024-07-30 PROCEDURE — 93880 EXTRACRANIAL BILAT STUDY: CPT

## 2024-07-30 PROCEDURE — 93923 UPR/LXTR ART STDY 3+ LVLS: CPT

## 2024-07-30 PROCEDURE — 99214 OFFICE O/P EST MOD 30 MIN: CPT | Mod: 25

## 2024-07-30 PROCEDURE — ZZZZZ: CPT | Mod: NC

## 2024-08-26 ENCOUNTER — APPOINTMENT (OUTPATIENT)
Dept: NEPHROLOGY | Facility: CLINIC | Age: 79
End: 2024-08-26

## 2024-09-30 RX ORDER — PEN NEEDLE, DIABETIC 32GX 5/32"
32G X 4 MM NEEDLE, DISPOSABLE MISCELLANEOUS
Qty: 300 | Refills: 1 | Status: ACTIVE | COMMUNITY
Start: 2024-09-30 | End: 1900-01-01

## 2024-10-14 ENCOUNTER — APPOINTMENT (OUTPATIENT)
Dept: OPHTHALMOLOGY | Facility: CLINIC | Age: 79
End: 2024-10-14
Payer: MEDICARE

## 2024-10-14 ENCOUNTER — NON-APPOINTMENT (OUTPATIENT)
Age: 79
End: 2024-10-14

## 2024-10-14 PROCEDURE — 92083 EXTENDED VISUAL FIELD XM: CPT

## 2024-10-14 PROCEDURE — 92014 COMPRE OPH EXAM EST PT 1/>: CPT

## 2024-10-14 PROCEDURE — 92250 FUNDUS PHOTOGRAPHY W/I&R: CPT

## 2024-10-18 ENCOUNTER — APPOINTMENT (OUTPATIENT)
Dept: ENDOCRINOLOGY | Facility: CLINIC | Age: 79
End: 2024-10-18

## 2024-10-18 VITALS
SYSTOLIC BLOOD PRESSURE: 127 MMHG | HEART RATE: 87 BPM | WEIGHT: 140 LBS | BODY MASS INDEX: 25.61 KG/M2 | DIASTOLIC BLOOD PRESSURE: 77 MMHG

## 2024-10-18 LAB
GLUCOSE BLDC GLUCOMTR-MCNC: 257
HBA1C MFR BLD HPLC: 9.6

## 2024-10-18 PROCEDURE — G2211 COMPLEX E/M VISIT ADD ON: CPT

## 2024-10-18 PROCEDURE — 83036 HEMOGLOBIN GLYCOSYLATED A1C: CPT | Mod: QW

## 2024-10-18 PROCEDURE — 99214 OFFICE O/P EST MOD 30 MIN: CPT

## 2024-10-18 PROCEDURE — 82962 GLUCOSE BLOOD TEST: CPT

## 2024-10-18 RX ORDER — INSULIN LISPRO 100 [IU]/ML
100 INJECTION, SOLUTION INTRAVENOUS; SUBCUTANEOUS
Qty: 3 | Refills: 1 | Status: ACTIVE | COMMUNITY
Start: 2024-10-18 | End: 1900-01-01

## 2024-10-18 RX ORDER — TIRZEPATIDE 15 MG/.5ML
15 INJECTION, SOLUTION SUBCUTANEOUS
Qty: 12 | Refills: 1 | Status: ACTIVE | COMMUNITY
Start: 2024-10-18 | End: 1900-01-01

## 2024-10-29 ENCOUNTER — APPOINTMENT (OUTPATIENT)
Dept: NEPHROLOGY | Facility: CLINIC | Age: 79
End: 2024-10-29
Payer: MEDICARE

## 2024-10-29 VITALS
HEART RATE: 84 BPM | DIASTOLIC BLOOD PRESSURE: 74 MMHG | WEIGHT: 140 LBS | SYSTOLIC BLOOD PRESSURE: 124 MMHG | BODY MASS INDEX: 25.61 KG/M2

## 2024-10-29 DIAGNOSIS — R80.9 PROTEINURIA, UNSPECIFIED: ICD-10-CM

## 2024-10-29 DIAGNOSIS — E11.9 TYPE 2 DIABETES MELLITUS W/OUT COMPLICATIONS: ICD-10-CM

## 2024-10-29 DIAGNOSIS — I10 ESSENTIAL (PRIMARY) HYPERTENSION: ICD-10-CM

## 2024-10-29 DIAGNOSIS — R31.9 HEMATURIA, UNSPECIFIED: ICD-10-CM

## 2024-10-29 DIAGNOSIS — N18.30 CHRONIC KIDNEY DISEASE, STAGE 3 UNSPECIFIED: ICD-10-CM

## 2024-10-29 PROCEDURE — 99214 OFFICE O/P EST MOD 30 MIN: CPT

## 2024-10-29 PROCEDURE — G2211 COMPLEX E/M VISIT ADD ON: CPT

## 2024-11-08 ENCOUNTER — RX RENEWAL (OUTPATIENT)
Age: 79
End: 2024-11-08

## 2024-12-31 ENCOUNTER — APPOINTMENT (OUTPATIENT)
Dept: INTERNAL MEDICINE | Facility: CLINIC | Age: 79
End: 2024-12-31

## 2025-01-28 ENCOUNTER — APPOINTMENT (OUTPATIENT)
Dept: INTERNAL MEDICINE | Facility: CLINIC | Age: 80
End: 2025-01-28
Payer: MEDICARE

## 2025-01-28 VITALS
HEART RATE: 101 BPM | BODY MASS INDEX: 25.95 KG/M2 | SYSTOLIC BLOOD PRESSURE: 136 MMHG | TEMPERATURE: 98.1 F | DIASTOLIC BLOOD PRESSURE: 80 MMHG | WEIGHT: 141 LBS | OXYGEN SATURATION: 97 % | HEIGHT: 62 IN

## 2025-01-28 DIAGNOSIS — I10 ESSENTIAL (PRIMARY) HYPERTENSION: ICD-10-CM

## 2025-01-28 DIAGNOSIS — R71.8 OTHER ABNORMALITY OF RED BLOOD CELLS: ICD-10-CM

## 2025-01-28 PROCEDURE — G0439: CPT

## 2025-01-28 PROCEDURE — G0008: CPT | Mod: GC

## 2025-01-28 PROCEDURE — G0444 DEPRESSION SCREEN ANNUAL: CPT | Mod: GC

## 2025-01-28 PROCEDURE — 90662 IIV NO PRSV INCREASED AG IM: CPT | Mod: GC

## 2025-01-29 ENCOUNTER — APPOINTMENT (OUTPATIENT)
Dept: HEART AND VASCULAR | Facility: CLINIC | Age: 80
End: 2025-01-29
Payer: MEDICARE

## 2025-01-29 VITALS
WEIGHT: 148 LBS | DIASTOLIC BLOOD PRESSURE: 84 MMHG | HEIGHT: 62 IN | SYSTOLIC BLOOD PRESSURE: 148 MMHG | BODY MASS INDEX: 27.23 KG/M2 | TEMPERATURE: 97.6 F | OXYGEN SATURATION: 96 % | HEART RATE: 94 BPM

## 2025-01-29 DIAGNOSIS — I65.23 OCCLUSION AND STENOSIS OF BILATERAL CAROTID ARTERIES: ICD-10-CM

## 2025-01-29 DIAGNOSIS — E11.9 TYPE 2 DIABETES MELLITUS W/OUT COMPLICATIONS: ICD-10-CM

## 2025-01-29 DIAGNOSIS — E78.5 HYPERLIPIDEMIA, UNSPECIFIED: ICD-10-CM

## 2025-01-29 DIAGNOSIS — M79.669 PAIN IN UNSPECIFIED LOWER LEG: ICD-10-CM

## 2025-01-29 DIAGNOSIS — I73.9 PERIPHERAL VASCULAR DISEASE, UNSPECIFIED: ICD-10-CM

## 2025-01-29 DIAGNOSIS — N18.30 CHRONIC KIDNEY DISEASE, STAGE 3 UNSPECIFIED: ICD-10-CM

## 2025-01-29 DIAGNOSIS — R60.0 LOCALIZED EDEMA: ICD-10-CM

## 2025-01-29 LAB
25(OH)D3 SERPL-MCNC: 39.8 NG/ML
ALBUMIN SERPL ELPH-MCNC: 4.6 G/DL
ALP BLD-CCNC: 110 U/L
ALT SERPL-CCNC: 28 U/L
ANION GAP SERPL CALC-SCNC: 24 MMOL/L
AST SERPL-CCNC: 23 U/L
BASOPHILS # BLD AUTO: 0.07 K/UL
BASOPHILS NFR BLD AUTO: 0.9 %
BILIRUB SERPL-MCNC: 0.5 MG/DL
BUN SERPL-MCNC: 26 MG/DL
CALCIUM SERPL-MCNC: 10.1 MG/DL
CHLORIDE SERPL-SCNC: 96 MMOL/L
CHOLEST SERPL-MCNC: 153 MG/DL
CO2 SERPL-SCNC: 21 MMOL/L
CREAT SERPL-MCNC: 1.09 MG/DL
CREAT SPEC-SCNC: 95 MG/DL
EGFR: 52 ML/MIN/1.73M2
EOSINOPHIL # BLD AUTO: 0.14 K/UL
EOSINOPHIL NFR BLD AUTO: 1.8 %
ESTIMATED AVERAGE GLUCOSE: 252 MG/DL
GLUCOSE SERPL-MCNC: 240 MG/DL
HBA1C MFR BLD HPLC: 10.4 %
HCT VFR BLD CALC: 44.1 %
HDLC SERPL-MCNC: 62 MG/DL
HGB BLD-MCNC: 14.5 G/DL
IMM GRANULOCYTES NFR BLD AUTO: 0.1 %
LDLC SERPL CALC-MCNC: 72 MG/DL
LYMPHOCYTES # BLD AUTO: 3.17 K/UL
LYMPHOCYTES NFR BLD AUTO: 41.1 %
MAN DIFF?: NORMAL
MCHC RBC-ENTMCNC: 29.1 PG
MCHC RBC-ENTMCNC: 32.9 G/DL
MCV RBC AUTO: 88.6 FL
MICROALBUMIN 24H UR DL<=1MG/L-MCNC: 33.6 MG/DL
MICROALBUMIN/CREAT 24H UR-RTO: 355 MG/G
MONOCYTES # BLD AUTO: 0.63 K/UL
MONOCYTES NFR BLD AUTO: 8.2 %
NEUTROPHILS # BLD AUTO: 3.7 K/UL
NEUTROPHILS NFR BLD AUTO: 47.9 %
NONHDLC SERPL-MCNC: 91 MG/DL
PLATELET # BLD AUTO: 300 K/UL
POTASSIUM SERPL-SCNC: 4 MMOL/L
PROT SERPL-MCNC: 7.5 G/DL
RBC # BLD: 4.98 M/UL
RBC # FLD: 13.3 %
SODIUM SERPL-SCNC: 141 MMOL/L
TRIGL SERPL-MCNC: 102 MG/DL
TSH SERPL-ACNC: 2.23 UIU/ML
WBC # FLD AUTO: 7.72 K/UL

## 2025-01-29 PROCEDURE — 93923 UPR/LXTR ART STDY 3+ LVLS: CPT

## 2025-01-29 PROCEDURE — 99214 OFFICE O/P EST MOD 30 MIN: CPT | Mod: 25

## 2025-02-25 ENCOUNTER — APPOINTMENT (OUTPATIENT)
Dept: ENDOCRINOLOGY | Facility: CLINIC | Age: 80
End: 2025-02-25
Payer: MEDICARE

## 2025-02-25 VITALS
BODY MASS INDEX: 26.31 KG/M2 | WEIGHT: 143 LBS | DIASTOLIC BLOOD PRESSURE: 80 MMHG | HEART RATE: 99 BPM | SYSTOLIC BLOOD PRESSURE: 145 MMHG | HEIGHT: 62 IN

## 2025-02-25 DIAGNOSIS — E11.319 TYPE 2 DIABETES MELLITUS WITH UNSPECIFIED DIABETIC RETINOPATHY W/OUT MACULAR EDEMA: ICD-10-CM

## 2025-02-25 DIAGNOSIS — E78.5 HYPERLIPIDEMIA, UNSPECIFIED: ICD-10-CM

## 2025-02-25 DIAGNOSIS — E11.3313 TYPE 2 DIABETES MELLITUS WITH MODERATE NONPROLIFERATIVE DIABETIC RETINOPATHY WITH MACULAR EDEMA, BILATERAL: ICD-10-CM

## 2025-02-25 DIAGNOSIS — E11.8 TYPE 2 DIABETES MELLITUS WITH UNSPECIFIED COMPLICATIONS: ICD-10-CM

## 2025-02-25 LAB
GLUCOSE BLDC GLUCOMTR-MCNC: 142
HBA1C MFR BLD HPLC: 9.2

## 2025-02-25 PROCEDURE — 99214 OFFICE O/P EST MOD 30 MIN: CPT

## 2025-02-25 PROCEDURE — G2211 COMPLEX E/M VISIT ADD ON: CPT

## 2025-02-25 PROCEDURE — 83036 HEMOGLOBIN GLYCOSYLATED A1C: CPT | Mod: QW

## 2025-02-25 PROCEDURE — 82962 GLUCOSE BLOOD TEST: CPT

## 2025-03-11 ENCOUNTER — RX RENEWAL (OUTPATIENT)
Age: 80
End: 2025-03-11

## 2025-03-27 ENCOUNTER — APPOINTMENT (OUTPATIENT)
Dept: NEPHROLOGY | Facility: CLINIC | Age: 80
End: 2025-03-27
Payer: MEDICARE

## 2025-03-27 VITALS — DIASTOLIC BLOOD PRESSURE: 78 MMHG | SYSTOLIC BLOOD PRESSURE: 121 MMHG

## 2025-03-27 DIAGNOSIS — R80.9 PROTEINURIA, UNSPECIFIED: ICD-10-CM

## 2025-03-27 DIAGNOSIS — N18.30 CHRONIC KIDNEY DISEASE, STAGE 3 UNSPECIFIED: ICD-10-CM

## 2025-03-27 DIAGNOSIS — E11.9 TYPE 2 DIABETES MELLITUS W/OUT COMPLICATIONS: ICD-10-CM

## 2025-03-27 DIAGNOSIS — I10 ESSENTIAL (PRIMARY) HYPERTENSION: ICD-10-CM

## 2025-03-27 PROCEDURE — 99214 OFFICE O/P EST MOD 30 MIN: CPT

## 2025-03-27 PROCEDURE — G2211 COMPLEX E/M VISIT ADD ON: CPT

## 2025-03-29 LAB
APPEARANCE: CLEAR
BACTERIA: NEGATIVE /HPF
BILIRUBIN URINE: NEGATIVE
BLOOD URINE: NEGATIVE
CAST: 0 /LPF
COLOR: YELLOW
CREAT SPEC-SCNC: 58 MG/DL
CREAT SPEC-SCNC: 58 MG/DL
CREAT/PROT UR: 0.4 RATIO
EPITHELIAL CELLS: 5 /HPF
GLUCOSE QUALITATIVE U: >=1000 MG/DL
KETONES URINE: NEGATIVE MG/DL
LEUKOCYTE ESTERASE URINE: NEGATIVE
MICROALBUMIN 24H UR DL<=1MG/L-MCNC: 7.6 MG/DL
MICROALBUMIN/CREAT 24H UR-RTO: 129 MG/G
MICROSCOPIC-UA: NORMAL
NITRITE URINE: NEGATIVE
PH URINE: 6.5
PROT UR-MCNC: 22 MG/DL
PROTEIN URINE: NORMAL MG/DL
RED BLOOD CELLS URINE: 0 /HPF
SPECIFIC GRAVITY URINE: 1.03
UROBILINOGEN URINE: 0.2 MG/DL
WHITE BLOOD CELLS URINE: 2 /HPF

## 2025-04-11 ENCOUNTER — NON-APPOINTMENT (OUTPATIENT)
Age: 80
End: 2025-04-11

## 2025-04-11 ENCOUNTER — APPOINTMENT (OUTPATIENT)
Dept: OPHTHALMOLOGY | Facility: CLINIC | Age: 80
End: 2025-04-11
Payer: MEDICARE

## 2025-04-11 PROCEDURE — 92083 EXTENDED VISUAL FIELD XM: CPT

## 2025-04-11 PROCEDURE — 99214 OFFICE O/P EST MOD 30 MIN: CPT

## 2025-04-11 PROCEDURE — 92133 CPTRZD OPH DX IMG PST SGM ON: CPT

## 2025-05-07 ENCOUNTER — RX RENEWAL (OUTPATIENT)
Age: 80
End: 2025-05-07

## 2025-06-02 ENCOUNTER — RX RENEWAL (OUTPATIENT)
Age: 80
End: 2025-06-02

## 2025-07-07 ENCOUNTER — RX RENEWAL (OUTPATIENT)
Age: 80
End: 2025-07-07

## 2025-07-14 ENCOUNTER — APPOINTMENT (OUTPATIENT)
Dept: ENDOCRINOLOGY | Facility: CLINIC | Age: 80
End: 2025-07-14
Payer: MEDICARE

## 2025-07-14 VITALS
BODY MASS INDEX: 25.42 KG/M2 | SYSTOLIC BLOOD PRESSURE: 139 MMHG | HEART RATE: 97 BPM | WEIGHT: 139 LBS | DIASTOLIC BLOOD PRESSURE: 77 MMHG

## 2025-07-14 PROCEDURE — 99214 OFFICE O/P EST MOD 30 MIN: CPT

## 2025-07-14 PROCEDURE — 83036 HEMOGLOBIN GLYCOSYLATED A1C: CPT | Mod: QW

## 2025-07-14 PROCEDURE — G2211 COMPLEX E/M VISIT ADD ON: CPT

## 2025-07-14 PROCEDURE — 82962 GLUCOSE BLOOD TEST: CPT

## 2025-09-23 LAB
ALBUMIN SERPL ELPH-MCNC: 4.4 G/DL
ALBUMIN, RANDOM URINE: 8.9 MG/DL
ANION GAP SERPL CALC-SCNC: 14 MMOL/L
APPEARANCE: CLEAR
BACTERIA: NEGATIVE /HPF
BILIRUBIN URINE: NEGATIVE
BLOOD URINE: NEGATIVE
BUN SERPL-MCNC: 29 MG/DL
CALCIUM SERPL-MCNC: 10.3 MG/DL
CALCIUM SERPL-MCNC: 10.3 MG/DL
CAST: 0 /LPF
CHLORIDE SERPL-SCNC: 99 MMOL/L
CO2 SERPL-SCNC: 28 MMOL/L
COLOR: YELLOW
CREAT SERPL-MCNC: 1.19 MG/DL
CREAT SPEC-SCNC: 67 MG/DL
EGFRCR SERPLBLD CKD-EPI 2021: 47 ML/MIN/1.73M2
EPITHELIAL CELLS: 2 /HPF
ESTIMATED AVERAGE GLUCOSE: 192 MG/DL
GLUCOSE QUALITATIVE U: >=1000 MG/DL
GLUCOSE SERPL-MCNC: 194 MG/DL
HBA1C MFR BLD HPLC: 8.3 %
HCT VFR BLD CALC: 47.4 %
HGB BLD-MCNC: 15.5 G/DL
KETONES URINE: NEGATIVE MG/DL
LEUKOCYTE ESTERASE URINE: NEGATIVE
MCHC RBC-ENTMCNC: 28.9 PG
MCHC RBC-ENTMCNC: 32.7 G/DL
MCV RBC AUTO: 88.3 FL
MICROALBUMIN/CREAT 24H UR-RTO: 132 MG/G
MICROSCOPIC-UA: NORMAL
NITRITE URINE: NEGATIVE
PARATHYROID HORMONE INTACT: 44 PG/ML
PH URINE: 6
PHOSPHATE SERPL-MCNC: 3.6 MG/DL
PLATELET # BLD AUTO: 287 K/UL
POTASSIUM SERPL-SCNC: 4.3 MMOL/L
PROTEIN URINE: NORMAL MG/DL
RBC # BLD: 5.37 M/UL
RBC # FLD: 14.5 %
RED BLOOD CELLS URINE: 0 /HPF
SODIUM SERPL-SCNC: 140 MMOL/L
SPECIFIC GRAVITY URINE: >1.03
UROBILINOGEN URINE: 0.2 MG/DL
WBC # FLD AUTO: 7.69 K/UL
WHITE BLOOD CELLS URINE: 1 /HPF

## (undated) DEVICE — FORCEP RADIAL JAW 4 W NDL 2.2MM 2.8MM 240CM ORANGE DISP